# Patient Record
Sex: FEMALE | Race: WHITE | NOT HISPANIC OR LATINO | Employment: OTHER | URBAN - METROPOLITAN AREA
[De-identification: names, ages, dates, MRNs, and addresses within clinical notes are randomized per-mention and may not be internally consistent; named-entity substitution may affect disease eponyms.]

---

## 2017-05-16 DIAGNOSIS — F17.210 CIGARETTE SMOKER: ICD-10-CM

## 2017-05-16 DIAGNOSIS — F17.290 CIGAR SMOKER MOTIVATED TO QUIT: Primary | ICD-10-CM

## 2017-05-16 RX ORDER — IBUPROFEN 200 MG
TABLET ORAL
COMMUNITY
End: 2017-05-16 | Stop reason: SDUPTHER

## 2017-05-16 RX ORDER — IBUPROFEN 200 MG
1 TABLET ORAL DAILY
Qty: 28 PATCH | Refills: 0 | Status: SHIPPED | OUTPATIENT
Start: 2017-05-16 | End: 2017-06-13

## 2017-06-23 ENCOUNTER — TELEPHONE (OUTPATIENT)
Dept: ALLERGY | Facility: CLINIC | Age: 42
End: 2017-06-23

## 2020-02-21 ENCOUNTER — OFFICE VISIT (OUTPATIENT)
Dept: FAMILY MEDICINE | Facility: CLINIC | Age: 45
End: 2020-02-21
Payer: MEDICAID

## 2020-02-21 VITALS
DIASTOLIC BLOOD PRESSURE: 88 MMHG | HEART RATE: 100 BPM | HEIGHT: 64 IN | BODY MASS INDEX: 39.98 KG/M2 | SYSTOLIC BLOOD PRESSURE: 126 MMHG | WEIGHT: 234.19 LBS

## 2020-02-21 DIAGNOSIS — S39.012D STRAIN OF LUMBAR REGION, SUBSEQUENT ENCOUNTER: ICD-10-CM

## 2020-02-21 DIAGNOSIS — M80.00XA OSTEOPOROSIS WITH CURRENT PATHOLOGICAL FRACTURE, UNSPECIFIED OSTEOPOROSIS TYPE, INITIAL ENCOUNTER: ICD-10-CM

## 2020-02-21 DIAGNOSIS — F41.9 ANXIETY: ICD-10-CM

## 2020-02-21 DIAGNOSIS — K58.0 IRRITABLE BOWEL SYNDROME WITH DIARRHEA: ICD-10-CM

## 2020-02-21 DIAGNOSIS — K21.9 GASTROESOPHAGEAL REFLUX DISEASE WITHOUT ESOPHAGITIS: ICD-10-CM

## 2020-02-21 DIAGNOSIS — M25.561 CHRONIC PAIN OF RIGHT KNEE: ICD-10-CM

## 2020-02-21 DIAGNOSIS — F33.1 MODERATE EPISODE OF RECURRENT MAJOR DEPRESSIVE DISORDER: ICD-10-CM

## 2020-02-21 DIAGNOSIS — F90.9 ATTENTION DEFICIT HYPERACTIVITY DISORDER (ADHD), UNSPECIFIED ADHD TYPE: ICD-10-CM

## 2020-02-21 DIAGNOSIS — I10 ESSENTIAL HYPERTENSION: Primary | ICD-10-CM

## 2020-02-21 DIAGNOSIS — Z78.0 MENOPAUSE: ICD-10-CM

## 2020-02-21 DIAGNOSIS — G25.81 RESTLESS LEGS SYNDROME (RLS): ICD-10-CM

## 2020-02-21 DIAGNOSIS — M17.10 ARTHRITIS OF KNEE: ICD-10-CM

## 2020-02-21 DIAGNOSIS — G89.29 CHRONIC PAIN OF RIGHT KNEE: ICD-10-CM

## 2020-02-21 PROCEDURE — 99205 OFFICE O/P NEW HI 60 MIN: CPT | Mod: S$GLB,,, | Performed by: NURSE PRACTITIONER

## 2020-02-21 PROCEDURE — 99205 PR OFFICE/OUTPT VISIT, NEW, LEVL V, 60-74 MIN: ICD-10-PCS | Mod: S$GLB,,, | Performed by: NURSE PRACTITIONER

## 2020-02-21 RX ORDER — ESCITALOPRAM OXALATE 10 MG/1
10 TABLET ORAL DAILY
Qty: 30 TABLET | Refills: 11 | Status: SHIPPED | OUTPATIENT
Start: 2020-02-21 | End: 2021-02-20

## 2020-02-21 RX ORDER — ZOLPIDEM TARTRATE 10 MG/1
10 TABLET ORAL DAILY PRN
COMMUNITY
Start: 2020-01-29

## 2020-02-21 RX ORDER — ALBUTEROL SULFATE 90 UG/1
2 AEROSOL, METERED RESPIRATORY (INHALATION)
COMMUNITY
Start: 2020-01-14

## 2020-02-21 RX ORDER — METHOCARBAMOL 500 MG/1
500 TABLET, FILM COATED ORAL 3 TIMES DAILY
COMMUNITY

## 2020-02-21 RX ORDER — CYCLOBENZAPRINE HCL 10 MG
10 TABLET ORAL 3 TIMES DAILY PRN
COMMUNITY

## 2020-02-21 RX ORDER — DEXTROAMPHETAMINE SACCHARATE, AMPHETAMINE ASPARTATE, DEXTROAMPHETAMINE SULFATE AND AMPHETAMINE SULFATE 7.5; 7.5; 7.5; 7.5 MG/1; MG/1; MG/1; MG/1
1 TABLET ORAL DAILY PRN
COMMUNITY
Start: 2020-01-16

## 2020-02-21 RX ORDER — PANTOPRAZOLE SODIUM 20 MG/1
20 TABLET, DELAYED RELEASE ORAL DAILY
Qty: 30 TABLET | Refills: 5 | Status: SHIPPED | OUTPATIENT
Start: 2020-02-21 | End: 2021-02-20

## 2020-02-21 RX ORDER — TRAMADOL HYDROCHLORIDE AND ACETAMINOPHEN 37.5; 325 MG/1; MG/1
2 TABLET, FILM COATED ORAL EVERY 6 HOURS PRN
Qty: 40 TABLET | Refills: 0 | Status: SHIPPED | OUTPATIENT
Start: 2020-02-21 | End: 2020-02-26

## 2020-02-21 RX ORDER — LOPERAMIDE HCL 2 MG
2 TABLET ORAL 2 TIMES DAILY
COMMUNITY

## 2020-02-21 RX ORDER — ACETAMINOPHEN 500 MG
5 TABLET ORAL DAILY PRN
COMMUNITY

## 2020-02-21 RX ORDER — PRAMIPEXOLE 1.5 MG/1
1.5 TABLET, EXTENDED RELEASE ORAL NIGHTLY
Qty: 30 TABLET | Refills: 2 | Status: SHIPPED | OUTPATIENT
Start: 2020-02-21 | End: 2021-02-20

## 2020-02-21 RX ORDER — LIDOCAINE 50 MG/G
1 PATCH TOPICAL
COMMUNITY

## 2020-02-21 NOTE — PROGRESS NOTES
SUBJECTIVE:    Patient ID: Chelsea Solano is a 44 y.o. female.    Chief Complaint: Establish Care (Pt is fasting to do labs.....mlr) and Medication Refill (Brought bottles, except Robaxin, Flexeril, Lidocaine patches.....mlr)    Patient presents to establish care.  Previous patient of Daniela Mancia.  Patient has significant history including anxiety, ADHD, hypertension.  Previous history of diabetes resolved once patient lost weight per patient.  Patient also with history of hysterectomy.  Current loss is pending related to fall and knee injury that caused patient to be on disability.  Recently presented to outside hospital after turning cause sharp pain in lower back.  X-ray showed no sign of fracture but patient states that she does in fact have a fracture.  First presented to [facility] on 2/8/2020 and again on 2/10/2020 due to continued pain in the area.  She was given tramadol at the time.  Presents today requesting more pain medication.  States she cannot get in with pain management due to her insurance being Medicaid.  States she is unable to make it to [place] to see a pain management doctor in network.  Medications include clonazepam, Adderall, Seroquel, and Robaxin.  States she had loose bowel movements for years and takes over-the-counter Imodium multiple times per day every day.  Was told the past that she has IBS.  Also states that she has suffered with depression and anxiety for years but has never been on long-term maintenance medication.  She was only prescribed clonazepam to deal with anxiety.  Patient is requesting an order for a DEXA scan given the nature of her injury she is concerned for osteoporosis.  Has a lot of home stressors including a pending divorce, a rash in to marriage, and an ill mother and brother.  She is currently helping her older sister care for them but is very limited due to her disability.        No visits with results within 6 Month(s) from this visit.   Latest  known visit with results is:   No results displayed because visit has over 200 results.          Past Medical History:   Diagnosis Date    ADHD (attention deficit hyperactivity disorder)     Anxiety     Bipolar 2 disorder     Depression     Diabetes mellitus     Hypertension 3/16/2013    Kidney stones March 2014    Migraine headache     TIA (transient ischemic attack)     UTI (urinary tract infection) March 2014     Past Surgical History:   Procedure Laterality Date    CHOLECYSTECTOMY      HYSTERECTOMY      INSERTION OF IMPLANTABLE LOOP RECORDER      KNEE SURGERY  2003    WRIST SURGERY  2001     Family History   Problem Relation Age of Onset    Hypertension Mother     Schizophrenia Mother     Heart disease Mother     Heart disease Father     Diabetes Father     Cancer Father     Cancer Maternal Grandmother        Marital Status:   Alcohol History:  reports that she drinks alcohol.  Tobacco History:  reports that she quit smoking about 7 years ago. She has a 20.00 pack-year smoking history. She has never used smokeless tobacco.  Drug History:  reports that she has current or past drug history. Drug: Hydrocodone.    Review of patient's allergies indicates:   Allergen Reactions    Toradol [ketorolac] Hives    Nsaids (non-steroidal anti-inflammatory drug)     Ondansetron Hives    Stadol [butorphanol tartrate] Other (See Comments)     hallucinations       Current Outpatient Medications:     albuterol (PROVENTIL/VENTOLIN HFA) 90 mcg/actuation inhaler, Inhale 2 puffs into the lungs as needed., Disp: , Rfl:     clonazePAM (KLONOPIN) 2 MG Tab, Take 2 mg by mouth 3 (three) times daily as needed., Disp: , Rfl: 5    cyclobenzaprine (FLEXERIL) 10 MG tablet, Take 10 mg by mouth 3 (three) times daily as needed for Muscle spasms., Disp: , Rfl:     dextroamphetamine-amphetamine 30 mg Tab, Take 1 tablet by mouth daily as needed., Disp: , Rfl:     lidocaine (LIDODERM) 5 %, Place 1 patch onto the  skin every 24 hours. Remove & Discard patch within 12 hours or as directed by MD, Disp: , Rfl:     loperamide (IMODIUM A-D) 2 mg Tab, Take 2 mg by mouth 2 (two) times daily., Disp: , Rfl:     melatonin (MELATIN), Take 5 mg by mouth daily as needed for Insomnia., Disp: , Rfl:     methocarbamol (ROBAXIN) 500 MG Tab, Take 500 mg by mouth 3 (three) times daily., Disp: , Rfl:     metoprolol tartrate (LOPRESSOR) 50 MG tablet, Take 1 tablet (50 mg total) by mouth 2 (two) times daily., Disp: 60 tablet, Rfl: 5    zolpidem (AMBIEN) 10 mg Tab, Take 10 mg by mouth daily as needed., Disp: , Rfl:     escitalopram oxalate (LEXAPRO) 10 MG tablet, Take 1 tablet (10 mg total) by mouth once daily., Disp: 30 tablet, Rfl: 11    pantoprazole (PROTONIX) 20 MG tablet, Take 1 tablet (20 mg total) by mouth once daily., Disp: 30 tablet, Rfl: 5    pramipexole 1.5 mg Tb24, Take 1.5 mg by mouth every evening., Disp: 30 tablet, Rfl: 2    quetiapine (SEROQUEL) 400 MG tablet, Take 400 mg by mouth once daily at 6am., Disp: , Rfl: 5    rifAXIMin (XIFAXAN) 550 mg Tab, Take 1 tablet (550 mg total) by mouth 3 (three) times daily. for 8 days, Disp: 24 tablet, Rfl: 0    tramadol-acetaminophen 37.5-325 mg (ULTRACET) 37.5-325 mg Tab, Take 2 tablets by mouth every 6 (six) hours as needed for Pain., Disp: 40 tablet, Rfl: 0    Review of Systems   Constitutional: Positive for fatigue.   HENT: Negative for congestion, ear pain, sinus pressure, sinus pain, tinnitus and trouble swallowing.    Eyes: Negative for pain and redness.   Respiratory: Negative for cough, chest tightness, shortness of breath and wheezing.    Cardiovascular: Negative for chest pain and palpitations.   Gastrointestinal: Positive for diarrhea. Negative for abdominal pain, nausea and vomiting.   Endocrine: Negative for polydipsia and polyuria.   Genitourinary: Negative for dysuria, frequency and urgency.   Musculoskeletal: Positive for arthralgias and back pain. Negative for  "myalgias.   Skin: Negative for rash and wound.   Allergic/Immunologic: Negative.    Neurological: Negative for dizziness, weakness, light-headedness and headaches.   Psychiatric/Behavioral: Positive for sleep disturbance (insomnia and RLS).          Objective:      Vitals:    02/21/20 0945   BP: 126/88   Pulse: 100   Weight: 106.2 kg (234 lb 3.2 oz)   Height: 5' 4" (1.626 m)     Body mass index is 40.2 kg/m².  Physical Exam   Constitutional: She is oriented to person, place, and time. She appears well-developed and well-nourished. No distress.   Overweight female   HENT:   Head: Normocephalic and atraumatic.   Right Ear: Tympanic membrane normal.   Left Ear: Tympanic membrane normal.   Nose: Nose normal. No mucosal edema or nasal deformity.   Mouth/Throat: Oropharynx is clear and moist and mucous membranes are normal. No dental abscesses or dental caries.   Eyes: Pupils are equal, round, and reactive to light. Conjunctivae, EOM and lids are normal.   Neck: Normal range of motion. No tracheal tenderness present. No thyromegaly present.   Cardiovascular: Normal rate, regular rhythm and normal heart sounds.   No murmur heard.  Pulmonary/Chest: Effort normal and breath sounds normal. No accessory muscle usage. No respiratory distress. She has no rhonchi.   Abdominal: Soft. Normal appearance and bowel sounds are normal. There is no tenderness.   Musculoskeletal: Normal range of motion. She exhibits tenderness.   Neurological: She is alert and oriented to person, place, and time.   Skin: Skin is warm and dry. Capillary refill takes less than 2 seconds. No bruising and no ecchymosis noted.   Psychiatric: She has a normal mood and affect. Cognition and memory are normal.   Vitals reviewed.        Assessment:       1. Essential hypertension    2. Osteoporosis with current pathological fracture, unspecified osteoporosis type, initial encounter    3. Arthritis of knee    4. Restless legs syndrome (RLS)    5. Gastroesophageal " reflux disease without esophagitis    6. Irritable bowel syndrome with diarrhea    7. Moderate episode of recurrent major depressive disorder    8. Menopause    9. Anxiety    10. Attention deficit hyperactivity disorder (ADHD), unspecified ADHD type    11. Chronic pain of right knee    12. Strain of lumbar region, subsequent encounter         Plan:       Essential hypertension  -     CBC auto differential; Future; Expected date: 02/21/2020  -     Comprehensive metabolic panel; Future; Expected date: 02/21/2020  -     Lipid panel; Future; Expected date: 02/21/2020  -     TSH; Future; Expected date: 02/21/2020  -     Urinalysis Microscopic; Future  - BP stable. Continue Lopressor    Osteoporosis with current pathological fracture, unspecified osteoporosis type, initial encounter  -     DXA Bone Density Spine And Hip; Future; Expected date: 02/21/2020  -     tramadol-acetaminophen 37.5-325 mg (ULTRACET) 37.5-325 mg Tab; Take 2 tablets by mouth every 6 (six) hours as needed for Pain.  Dispense: 40 tablet; Refill: 0      Arthritis of knee  -     tramadol-acetaminophen 37.5-325 mg (ULTRACET) 37.5-325 mg Tab; Take 2 tablets by mouth every 6 (six) hours as needed for Pain.  Dispense: 40 tablet; Refill: 0    Restless legs syndrome (RLS)  -     pramipexole 1.5 mg Tb24; Take 1.5 mg by mouth every evening.  Dispense: 30 tablet; Refill: 2    Gastroesophageal reflux disease without esophagitis  -     pantoprazole (PROTONIX) 20 MG tablet; Take 1 tablet (20 mg total) by mouth once daily.  Dispense: 30 tablet; Refill: 5    Irritable bowel syndrome with diarrhea  -     rifAXIMin (XIFAXAN) 550 mg Tab; Take 1 tablet (550 mg total) by mouth 3 (three) times daily. for 8 days  Dispense: 24 tablet; Refill: 0    Moderate episode of recurrent major depressive disorder  -     escitalopram oxalate (LEXAPRO) 10 MG tablet; Take 1 tablet (10 mg total) by mouth once daily.  Dispense: 30 tablet; Refill: 11    Menopause  -     DXA Bone Density Spine  And Hip; Future; Expected date: 02/21/2020    Anxiety    Attention deficit hyperactivity disorder (ADHD), unspecified ADHD type    Chronic pain of right knee  - Explained to pt that we will not be writing pain medication for chronic pain.    Strain of lumbar region, subsequent encounter  - Explained to pt that we will not continue to write pain medication for back injury. She will need to find a way to get to an accepted pain management doctor.    Follow up in about 3 months (around 5/21/2020).

## 2020-02-24 PROBLEM — K58.0 IRRITABLE BOWEL SYNDROME WITH DIARRHEA: Status: ACTIVE | Noted: 2020-02-24

## 2020-02-24 PROBLEM — M80.00XA OSTEOPOROSIS WITH CURRENT PATHOLOGICAL FRACTURE: Status: ACTIVE | Noted: 2020-02-24

## 2020-02-24 PROBLEM — K21.9 GASTROESOPHAGEAL REFLUX DISEASE WITHOUT ESOPHAGITIS: Status: ACTIVE | Noted: 2020-02-24

## 2020-02-24 PROBLEM — F33.1 MODERATE EPISODE OF RECURRENT MAJOR DEPRESSIVE DISORDER: Status: ACTIVE | Noted: 2020-02-24

## 2020-02-24 PROBLEM — G25.81 RESTLESS LEGS SYNDROME (RLS): Status: ACTIVE | Noted: 2020-02-24

## 2020-03-05 ENCOUNTER — TELEPHONE (OUTPATIENT)
Dept: FAMILY MEDICINE | Facility: CLINIC | Age: 45
End: 2020-03-05

## 2020-03-05 NOTE — TELEPHONE ENCOUNTER
----- Message from Kirk Bobo sent at 3/5/2020  3:17 PM CST -----  PT WAS RX XIFAXAN BY [facility] BUT ITS NOT IN THE CHART AND PT IS NEEDING A P.A. ON THE MED   ITS A P.A. STILL NEEDED ON MED ?

## 2020-03-05 NOTE — TELEPHONE ENCOUNTER
It's not showing up because I ordered it a while ago and it's showing as ended. Yes she still needs it.

## 2020-03-10 ENCOUNTER — TELEPHONE (OUTPATIENT)
Dept: FAMILY MEDICINE | Facility: CLINIC | Age: 45
End: 2020-03-10

## 2020-03-11 ENCOUNTER — TELEPHONE (OUTPATIENT)
Dept: FAMILY MEDICINE | Facility: CLINIC | Age: 45
End: 2020-03-11

## 2020-03-11 ENCOUNTER — OFFICE VISIT (OUTPATIENT)
Dept: FAMILY MEDICINE | Facility: CLINIC | Age: 45
End: 2020-03-11
Payer: MEDICAID

## 2020-03-11 ENCOUNTER — HOSPITAL ENCOUNTER (OUTPATIENT)
Dept: RADIOLOGY | Facility: HOSPITAL | Age: 45
Discharge: HOME | End: 2020-03-11
Attending: NURSE PRACTITIONER
Payer: MEDICAID

## 2020-03-11 VITALS
HEIGHT: 64 IN | TEMPERATURE: 99 F | DIASTOLIC BLOOD PRESSURE: 68 MMHG | SYSTOLIC BLOOD PRESSURE: 108 MMHG | HEART RATE: 75 BPM | BODY MASS INDEX: 39.95 KG/M2 | WEIGHT: 234 LBS | OXYGEN SATURATION: 96 %

## 2020-03-11 DIAGNOSIS — F17.210 CIGARETTE NICOTINE DEPENDENCE WITHOUT COMPLICATION: ICD-10-CM

## 2020-03-11 DIAGNOSIS — F39 MOOD DISORDER: ICD-10-CM

## 2020-03-11 DIAGNOSIS — I10 ESSENTIAL HYPERTENSION: ICD-10-CM

## 2020-03-11 DIAGNOSIS — R05.9 COUGH: ICD-10-CM

## 2020-03-11 DIAGNOSIS — J18.9 PNEUMONIA OF LEFT LOWER LOBE DUE TO INFECTIOUS ORGANISM: ICD-10-CM

## 2020-03-11 DIAGNOSIS — J18.9 PNEUMONIA OF LEFT LOWER LOBE DUE TO INFECTIOUS ORGANISM: Primary | ICD-10-CM

## 2020-03-11 PROCEDURE — 99214 PR OFFICE/OUTPT VISIT, EST, LEVL IV, 30-39 MIN: ICD-10-PCS | Mod: S$GLB,,, | Performed by: NURSE PRACTITIONER

## 2020-03-11 PROCEDURE — 99214 OFFICE O/P EST MOD 30 MIN: CPT | Mod: S$GLB,,, | Performed by: NURSE PRACTITIONER

## 2020-03-11 PROCEDURE — 71046 X-RAY EXAM CHEST 2 VIEWS: CPT | Mod: TC,PO

## 2020-03-11 RX ORDER — PROMETHAZINE HYDROCHLORIDE AND DEXTROMETHORPHAN HYDROBROMIDE 6.25; 15 MG/5ML; MG/5ML
5 SYRUP ORAL EVERY 4 HOURS PRN
Qty: 210 ML | Refills: 0 | Status: SHIPPED | OUTPATIENT
Start: 2020-03-11 | End: 2020-03-18

## 2020-03-11 RX ORDER — IPRATROPIUM BROMIDE AND ALBUTEROL SULFATE 2.5; .5 MG/3ML; MG/3ML
3 SOLUTION RESPIRATORY (INHALATION) EVERY 6 HOURS PRN
Qty: 1 BOX | Refills: 0 | Status: SHIPPED | OUTPATIENT
Start: 2020-03-11 | End: 2020-03-24 | Stop reason: SDUPTHER

## 2020-03-11 RX ORDER — DOXYCYCLINE 100 MG/1
100 CAPSULE ORAL 2 TIMES DAILY
Qty: 14 CAPSULE | Refills: 0 | Status: SHIPPED | OUTPATIENT
Start: 2020-03-11 | End: 2020-03-18

## 2020-03-11 NOTE — TELEPHONE ENCOUNTER
----- Message from Bertha Mcneil NP sent at 3/11/2020  1:55 PM CDT -----  Chest xray does not show pneumonia. Keep taking medication as prescribed.

## 2020-03-11 NOTE — PROGRESS NOTES
SUBJECTIVE:    Patient ID: Chelsea Solano is a 44 y.o. female.    Chief Complaint: Cough (since Saturday// SW); Fever (SW); and Sore Throat (SW)    Pt presents with c/o cough and fevers since Saturday.  Reports sister who she lives with is also sick.  T-max 102°.  Some sinus congestion as well as very sore throat.  Patient has extensive history including anxiety, hypertension, IBS, and chronic pain.  States she has a history of frequent pneumonias and sees Dr. Mata.  Reports she was previously diagnosed with CVID however no reports available regarding this.  Patient currently still smokes.  She has taken over-the-counter Mucinex with minimal relief.  Taking ibuprofen for fever control.  No fever in office today but states she recently took ibuprofen.  Denies any recent travel or or close contacts with recent travel.      No visits with results within 6 Month(s) from this visit.   Latest known visit with results is:   No results displayed because visit has over 200 results.          Past Medical History:   Diagnosis Date    ADHD (attention deficit hyperactivity disorder)     Anxiety     Bipolar 2 disorder     Depression     Diabetes mellitus     Hypertension 3/16/2013    Kidney stones March 2014    Migraine headache     TIA (transient ischemic attack)     UTI (urinary tract infection) March 2014     Past Surgical History:   Procedure Laterality Date    CHOLECYSTECTOMY      HYSTERECTOMY      INSERTION OF IMPLANTABLE LOOP RECORDER      KNEE SURGERY  2003    WRIST SURGERY  2001     Family History   Problem Relation Age of Onset    Hypertension Mother     Schizophrenia Mother     Heart disease Mother     Heart disease Father     Diabetes Father     Cancer Father     Cancer Maternal Grandmother        Marital Status:   Alcohol History:  reports that she drinks alcohol.  Tobacco History:  reports that she quit smoking about 8 years ago. She has a 20.00 pack-year smoking history.  She has never used smokeless tobacco.  Drug History:  reports that she has current or past drug history. Drug: Hydrocodone.    Review of patient's allergies indicates:   Allergen Reactions    Toradol [ketorolac] Hives    Nsaids (non-steroidal anti-inflammatory drug)     Ondansetron Hives    Stadol [butorphanol tartrate] Other (See Comments)     hallucinations       Current Outpatient Medications:     albuterol (PROVENTIL/VENTOLIN HFA) 90 mcg/actuation inhaler, Inhale 2 puffs into the lungs as needed., Disp: , Rfl:     albuterol-ipratropium (DUO-NEB) 2.5 mg-0.5 mg/3 mL nebulizer solution, Take 3 mLs by nebulization every 6 (six) hours as needed for Wheezing. Rescue, Disp: 1 Box, Rfl: 0    clonazePAM (KLONOPIN) 2 MG Tab, Take 2 mg by mouth 3 (three) times daily as needed., Disp: , Rfl: 5    cyclobenzaprine (FLEXERIL) 10 MG tablet, Take 10 mg by mouth 3 (three) times daily as needed for Muscle spasms., Disp: , Rfl:     dextroamphetamine-amphetamine 30 mg Tab, Take 1 tablet by mouth daily as needed., Disp: , Rfl:     doxycycline (MONODOX) 100 MG capsule, Take 1 capsule (100 mg total) by mouth 2 (two) times daily. for 7 days, Disp: 14 capsule, Rfl: 0    escitalopram oxalate (LEXAPRO) 10 MG tablet, Take 1 tablet (10 mg total) by mouth once daily., Disp: 30 tablet, Rfl: 11    lidocaine (LIDODERM) 5 %, Place 1 patch onto the skin every 24 hours. Remove & Discard patch within 12 hours or as directed by MD, Disp: , Rfl:     loperamide (IMODIUM A-D) 2 mg Tab, Take 2 mg by mouth 2 (two) times daily., Disp: , Rfl:     melatonin (MELATIN), Take 5 mg by mouth daily as needed for Insomnia., Disp: , Rfl:     methocarbamol (ROBAXIN) 500 MG Tab, Take 500 mg by mouth 3 (three) times daily., Disp: , Rfl:     metoprolol tartrate (LOPRESSOR) 50 MG tablet, Take 1 tablet (50 mg total) by mouth 2 (two) times daily., Disp: 60 tablet, Rfl: 5    pantoprazole (PROTONIX) 20 MG tablet, Take 1 tablet (20 mg total) by mouth once  "daily., Disp: 30 tablet, Rfl: 5    pramipexole 1.5 mg Tb24, Take 1.5 mg by mouth every evening., Disp: 30 tablet, Rfl: 2    promethazine-dextromethorphan (PROMETHAZINE-DM) 6.25-15 mg/5 mL Syrp, Take 5 mLs by mouth every 4 (four) hours as needed., Disp: 210 mL, Rfl: 0    quetiapine (SEROQUEL) 400 MG tablet, Take 400 mg by mouth once daily at 6am., Disp: , Rfl: 5    zolpidem (AMBIEN) 10 mg Tab, Take 10 mg by mouth daily as needed., Disp: , Rfl:     Review of Systems   Constitutional: Positive for appetite change, chills, fatigue and fever.   HENT: Positive for congestion, sinus pressure and sore throat. Negative for ear pain and sinus pain.    Eyes: Negative for pain and redness.   Respiratory: Positive for cough and shortness of breath. Negative for wheezing.    Cardiovascular: Negative for chest pain and palpitations.   Gastrointestinal: Negative for nausea and vomiting.   Genitourinary: Negative for dysuria.   Musculoskeletal: Positive for myalgias. Negative for neck pain.   Skin: Negative.    Allergic/Immunologic: Negative.    Neurological: Positive for headaches. Negative for dizziness and weakness.   Psychiatric/Behavioral: Negative.           Objective:      Vitals:    03/11/20 1115   BP: 108/68   Pulse: 75   Temp: 98.7 °F (37.1 °C)   SpO2: 96%   Weight: 106.1 kg (234 lb)   Height: 5' 4" (1.626 m)     Body mass index is 40.17 kg/m².  Physical Exam   Constitutional: She is oriented to person, place, and time. She appears well-developed and well-nourished.   HENT:   Head: Normocephalic.   Nose: Nose normal.   Mouth/Throat: Posterior oropharyngeal erythema present.   Eyes: Pupils are equal, round, and reactive to light.   Neck: Normal range of motion.   Cardiovascular: Normal rate and regular rhythm.   Pulmonary/Chest: Effort normal. No respiratory distress. She has no wheezes. She has rhonchi in the left middle field and the left lower field.   Abdominal: Soft.   Musculoskeletal: Normal range of motion. "   Lymphadenopathy:     She has no cervical adenopathy.   Neurological: She is alert and oriented to person, place, and time.   Skin: Skin is warm and dry.   Psychiatric: She has a normal mood and affect.         Assessment:       1. Pneumonia of left lower lobe due to infectious organism    2. Cough    3. Essential hypertension    4. Mood disorder    5. Cigarette nicotine dependence without complication         Plan:       Pneumonia of left lower lobe due to infectious organism  -     doxycycline (MONODOX) 100 MG capsule; Take 1 capsule (100 mg total) by mouth 2 (two) times daily. for 7 days  Dispense: 14 capsule; Refill: 0  -     promethazine-dextromethorphan (PROMETHAZINE-DM) 6.25-15 mg/5 mL Syrp; Take 5 mLs by mouth every 4 (four) hours as needed.  Dispense: 210 mL; Refill: 0  -     NEBULIZER FOR HOME USE  -     X-Ray Chest PA And Lateral; Future; Expected date: 03/11/2020  -     albuterol-ipratropium (DUO-NEB) 2.5 mg-0.5 mg/3 mL nebulizer solution; Take 3 mLs by nebulization every 6 (six) hours as needed for Wheezing. Rescue  Dispense: 1 Box; Refill: 0    Cough  -     promethazine-dextromethorphan (PROMETHAZINE-DM) 6.25-15 mg/5 mL Syrp; Take 5 mLs by mouth every 4 (four) hours as needed.  Dispense: 210 mL; Refill: 0  -     NEBULIZER FOR HOME USE  -     albuterol-ipratropium (DUO-NEB) 2.5 mg-0.5 mg/3 mL nebulizer solution; Take 3 mLs by nebulization every 6 (six) hours as needed for Wheezing. Rescue  Dispense: 1 Box; Refill: 0  - Continue Mucinex    Essential hypertension    Mood disorder    Cigarette nicotine dependence without complication  - Discussion with patient on affects of cigarette smoking on respiratory illnesses. Pt states she has nicotine patches and plans to try to quit soon.    Continue Ibuprofen as needed for fever control. Rest, fluids. Advised that if she feels SOB, difficulty getting around, or feels worse to please go to ER.  Follow up if symptoms worsen or fail to improve.

## 2020-03-16 ENCOUNTER — TELEPHONE (OUTPATIENT)
Dept: FAMILY MEDICINE | Facility: CLINIC | Age: 45
End: 2020-03-16

## 2020-03-16 DIAGNOSIS — R05.9 COUGH: Primary | ICD-10-CM

## 2020-03-16 DIAGNOSIS — R05.9 COUGH: ICD-10-CM

## 2020-03-16 DIAGNOSIS — J18.9 PNEUMONIA OF LEFT LOWER LOBE DUE TO INFECTIOUS ORGANISM: Primary | ICD-10-CM

## 2020-03-16 RX ORDER — CODEINE PHOSPHATE AND GUAIFENESIN 10; 100 MG/5ML; MG/5ML
5 SOLUTION ORAL EVERY 6 HOURS PRN
Qty: 100 ML | Refills: 0 | Status: SHIPPED | OUTPATIENT
Start: 2020-03-16 | End: 2020-03-21

## 2020-03-16 RX ORDER — PROMETHAZINE HYDROCHLORIDE AND CODEINE PHOSPHATE 6.25; 1 MG/5ML; MG/5ML
5 SOLUTION ORAL EVERY 8 HOURS PRN
Qty: 45 ML | Refills: 0 | Status: SHIPPED | OUTPATIENT
Start: 2020-03-16 | End: 2020-03-19

## 2020-03-16 RX ORDER — LEVOFLOXACIN 750 MG/1
750 TABLET ORAL DAILY
Qty: 7 TABLET | Refills: 0 | Status: SHIPPED | OUTPATIENT
Start: 2020-03-16 | End: 2020-03-23

## 2020-03-16 NOTE — TELEPHONE ENCOUNTER
----- Message from Dianne Lewis sent at 3/16/2020  3:05 PM CDT -----  Received a call from [facility] the Promethazine w/Codeine is not available wanting to switch to DM. Earnix was going to check with the pt and call us back.

## 2020-03-16 NOTE — TELEPHONE ENCOUNTER
Spoke to pt. Said her fever is around 101 like her sister but has gone down since taking OTC ibuprofen. She has not been out of the house since she saw you last Monday. Only once to go  Rx's with sister. Symptoms still the same. Antibiotic & cough syrup didn't seem to help at all.. Wanting a different / stronger antibiotic & cough syrup.

## 2020-03-16 NOTE — TELEPHONE ENCOUNTER
----- Message from Chelsea Beyer sent at 3/16/2020  3:44 PM CDT -----  Pt is not able to find the cough medication. Would like a hard script. Waiting for rx

## 2020-03-16 NOTE — TELEPHONE ENCOUNTER
----- Message from Farnaz Brennan sent at 3/16/2020  3:12 PM CDT -----  Contact: pt  Pt called and [facility] on [address]  Has the cough syrup and its cherry tussin    [phone]    Its also for her sister: Sylvester Eligio

## 2020-03-16 NOTE — TELEPHONE ENCOUNTER
Will send but keep up with breathing treatments. Cough may linger for up to one month after infection.

## 2020-03-16 NOTE — TELEPHONE ENCOUNTER
----- Message from Dianne Lewis sent at 3/16/2020  8:46 AM CDT -----  Pt states she was seen in clinic last week and still running fever, severe cough,sore throat and congestion. Pt is out of the abx, cough syrup. No known Corona exposure, negative travel.

## 2020-03-16 NOTE — TELEPHONE ENCOUNTER
How high is fever? Has she been out in the community at all in the last 2 weeks? If there is any chance of exposure, please refer to testing sites. Otherwise we can strengthen antibiotic. Make sure to keep doing breathing treatments and quit smoking until recovery.

## 2020-03-23 ENCOUNTER — TELEPHONE (OUTPATIENT)
Dept: FAMILY MEDICINE | Facility: CLINIC | Age: 45
End: 2020-03-23

## 2020-03-23 RX ORDER — CODEINE PHOSPHATE AND GUAIFENESIN 10; 100 MG/5ML; MG/5ML
5 SOLUTION ORAL EVERY 6 HOURS PRN
Qty: 100 ML | Refills: 0 | Status: SHIPPED | OUTPATIENT
Start: 2020-03-23 | End: 2020-03-24 | Stop reason: SDUPTHER

## 2020-03-23 NOTE — TELEPHONE ENCOUNTER
Fever, 100.7 (102.9) congestion, bad cough, throat severely sore, Tylenol and motrin not effective for fever. Says cough syrup with codeine breaks fever but she ran out    Asked travel questions negative for travel, says dad is waiting on COVID-19 results.

## 2020-03-23 NOTE — TELEPHONE ENCOUNTER
Please call and clarify what symptoms they are still having- any fever, SOB? Based on their previous symptoms sounds like could have been the flu or COVID19 though outside of the window for flu treatment. If she's still having fever then recommned [facility]

## 2020-03-23 NOTE — TELEPHONE ENCOUNTER
----- Message from Kirk Bobo sent at 3/23/2020 10:22 AM CDT -----  Pt is still sick, the antibiotic  and cough meds are not helping. Was here to see last week.  Pt is needing something else called in   Pharm [facility] [address]  Pt [phone]

## 2020-03-23 NOTE — TELEPHONE ENCOUNTER
I would recommend they call the [facility] clinic regarding testing- I will send in one refill of cough medicine

## 2020-03-24 DIAGNOSIS — J18.9 PNEUMONIA OF LEFT LOWER LOBE DUE TO INFECTIOUS ORGANISM: ICD-10-CM

## 2020-03-24 DIAGNOSIS — R05.9 COUGH: ICD-10-CM

## 2020-03-24 DIAGNOSIS — R05.9 COUGH: Primary | ICD-10-CM

## 2020-03-24 RX ORDER — CODEINE PHOSPHATE AND GUAIFENESIN 10; 100 MG/5ML; MG/5ML
5 SOLUTION ORAL EVERY 6 HOURS PRN
Qty: 100 ML | Refills: 0 | Status: SHIPPED | OUTPATIENT
Start: 2020-03-24 | End: 2020-04-03

## 2020-03-24 RX ORDER — IPRATROPIUM BROMIDE AND ALBUTEROL SULFATE 2.5; .5 MG/3ML; MG/3ML
3 SOLUTION RESPIRATORY (INHALATION) EVERY 6 HOURS PRN
Qty: 1 BOX | Refills: 0 | Status: SHIPPED | OUTPATIENT
Start: 2020-03-24 | End: 2020-04-22 | Stop reason: SDUPTHER

## 2020-03-24 NOTE — TELEPHONE ENCOUNTER
Please approve, Zenobia sent cough medication and her pharmacy does not have in stock, sending to another pharmacy. Thanks        Also, gave number for COVID-19 testing hotline. Would like to know what they are to do IF they are able to get tested and when waiting on results. Please advise. Thanks

## 2020-03-24 NOTE — TELEPHONE ENCOUNTER
----- Message from Daniela Johansen MA sent at 3/24/2020 10:01 AM CDT -----  Pt is returning your call from yesterday afternoon, regarding her illness    Pt - [phone]

## 2020-03-25 ENCOUNTER — TELEPHONE (OUTPATIENT)
Dept: FAMILY MEDICINE | Facility: CLINIC | Age: 45
End: 2020-03-25

## 2020-03-25 NOTE — TELEPHONE ENCOUNTER
----- Message from Daniela Johansen MA sent at 3/25/2020 11:41 AM CDT -----  Pt is requesting a return call, would not give me a reason.

## 2020-03-27 ENCOUNTER — TELEPHONE (OUTPATIENT)
Dept: FAMILY MEDICINE | Facility: CLINIC | Age: 45
End: 2020-03-27

## 2020-03-27 ENCOUNTER — TELEPHONE (OUTPATIENT)
Dept: EMERGENCY MEDICINE | Facility: HOSPITAL | Age: 45
End: 2020-03-27

## 2020-03-27 DIAGNOSIS — J18.9 PNEUMONIA OF LEFT LOWER LOBE DUE TO INFECTIOUS ORGANISM: ICD-10-CM

## 2020-03-27 DIAGNOSIS — R05.9 COUGH: ICD-10-CM

## 2020-03-27 RX ORDER — IPRATROPIUM BROMIDE AND ALBUTEROL SULFATE 2.5; .5 MG/3ML; MG/3ML
3 SOLUTION RESPIRATORY (INHALATION) EVERY 6 HOURS PRN
Qty: 1 BOX | Refills: 0 | Status: CANCELLED | OUTPATIENT
Start: 2020-03-27 | End: 2021-03-27

## 2020-03-27 NOTE — TELEPHONE ENCOUNTER
No. She was written for 5 days worth of cough medication on 3/24. She is not getting codeine cough medication indefinitely. This has been 2 weeks now.

## 2020-03-27 NOTE — TELEPHONE ENCOUNTER
----- Message from Christiephil Jeramie sent at 3/27/2020 10:33 AM CDT -----  Pt is saying that she is needing more cough meds   Pharm [facility] [address]   Pt [phone]

## 2020-03-27 NOTE — TELEPHONE ENCOUNTER
Is she wanting an inhaler or neb treatments? I can send in either but please advise her that albuterol is in short supply right now and she may potentially have difficulty getting it.

## 2020-03-27 NOTE — TELEPHONE ENCOUNTER
----- Message from Kirk Bobo sent at 3/27/2020 10:36 AM CDT -----  Albuterol   [facility]   [phone]

## 2020-03-27 NOTE — TELEPHONE ENCOUNTER
Spoke with pt let her know the below, pt stated she will run out before the weekend is up, let pt know per the provider she is not sending in anymore, pt verbalized under standing.

## 2020-04-22 DIAGNOSIS — J18.9 PNEUMONIA OF LEFT LOWER LOBE DUE TO INFECTIOUS ORGANISM: ICD-10-CM

## 2020-04-22 DIAGNOSIS — R05.9 COUGH: ICD-10-CM

## 2020-04-22 RX ORDER — IPRATROPIUM BROMIDE AND ALBUTEROL SULFATE 2.5; .5 MG/3ML; MG/3ML
3 SOLUTION RESPIRATORY (INHALATION) EVERY 6 HOURS PRN
Qty: 1 BOX | Refills: 0 | Status: SHIPPED | OUTPATIENT
Start: 2020-04-22 | End: 2021-04-22

## 2020-05-07 ENCOUNTER — ANESTHESIA EVENT (OUTPATIENT)
Dept: INTENSIVE CARE | Facility: HOSPITAL | Age: 45
DRG: 917 | End: 2020-05-07
Payer: COMMERCIAL

## 2020-05-07 ENCOUNTER — ANESTHESIA (OUTPATIENT)
Dept: INTENSIVE CARE | Facility: HOSPITAL | Age: 45
DRG: 917 | End: 2020-05-07
Payer: COMMERCIAL

## 2020-05-07 ENCOUNTER — CLINICAL SUPPORT (OUTPATIENT)
Dept: CARDIOLOGY | Facility: HOSPITAL | Age: 45
DRG: 917 | End: 2020-05-07
Attending: INTERNAL MEDICINE
Payer: COMMERCIAL

## 2020-05-07 ENCOUNTER — HOSPITAL ENCOUNTER (INPATIENT)
Facility: HOSPITAL | Age: 45
LOS: 2 days | DRG: 917 | End: 2020-05-09
Attending: EMERGENCY MEDICINE | Admitting: STUDENT IN AN ORGANIZED HEALTH CARE EDUCATION/TRAINING PROGRAM
Payer: MEDICAID

## 2020-05-07 VITALS — HEIGHT: 64 IN | WEIGHT: 234.38 LBS | BODY MASS INDEX: 40.01 KG/M2

## 2020-05-07 DIAGNOSIS — E23.2 DIABETES INSIPIDUS: ICD-10-CM

## 2020-05-07 DIAGNOSIS — J96.01 ACUTE HYPOXEMIC RESPIRATORY FAILURE: ICD-10-CM

## 2020-05-07 DIAGNOSIS — T40.604D OPIATE OVERDOSE, UNDETERMINED INTENT, SUBSEQUENT ENCOUNTER: ICD-10-CM

## 2020-05-07 DIAGNOSIS — R07.9 CHEST PAIN: ICD-10-CM

## 2020-05-07 DIAGNOSIS — G93.6 CEREBRAL EDEMA: ICD-10-CM

## 2020-05-07 DIAGNOSIS — T17.800D ASPIRATION INTO LOWER RESPIRATORY TRACT, SUBSEQUENT ENCOUNTER: ICD-10-CM

## 2020-05-07 DIAGNOSIS — R00.0 SINUS TACHYCARDIA: ICD-10-CM

## 2020-05-07 DIAGNOSIS — I46.9 CARDIAC ARREST: ICD-10-CM

## 2020-05-07 PROBLEM — T17.800A ASPIRATION INTO LOWER RESPIRATORY TRACT: Status: ACTIVE | Noted: 2020-05-07

## 2020-05-07 PROBLEM — Z79.899 POLYPHARMACY: Status: ACTIVE | Noted: 2020-05-07

## 2020-05-07 PROBLEM — T42.4X1A BENZODIAZEPINE OVERDOSE: Status: ACTIVE | Noted: 2020-05-07

## 2020-05-07 PROBLEM — T40.601A OPIATE OVERDOSE: Status: ACTIVE | Noted: 2020-05-07

## 2020-05-07 LAB
ALBUMIN SERPL BCP-MCNC: 3.5 G/DL (ref 3.5–5.2)
ALBUMIN SERPL BCP-MCNC: 3.7 G/DL (ref 3.5–5.2)
ALLENS TEST: ABNORMAL
ALP SERPL-CCNC: 269 U/L (ref 55–135)
ALP SERPL-CCNC: 274 U/L (ref 55–135)
ALT SERPL W/O P-5'-P-CCNC: 60 U/L (ref 10–44)
ALT SERPL W/O P-5'-P-CCNC: 67 U/L (ref 10–44)
AMPHET+METHAMPHET UR QL: NEGATIVE
ANION GAP SERPL CALC-SCNC: 10 MMOL/L (ref 8–16)
ANION GAP SERPL CALC-SCNC: 12 MMOL/L (ref 8–16)
ANION GAP SERPL CALC-SCNC: 14 MMOL/L (ref 8–16)
ANION GAP SERPL CALC-SCNC: 9 MMOL/L (ref 8–16)
ANION GAP SERPL CALC-SCNC: 9 MMOL/L (ref 8–16)
AORTIC ROOT ANNULUS: 2.68 CM
AORTIC VALVE CUSP SEPERATION: 1.93 CM
APAP SERPL-MCNC: <10 UG/ML (ref 10–20)
AST SERPL-CCNC: 122 U/L (ref 10–40)
AST SERPL-CCNC: 129 U/L (ref 10–40)
AV INDEX (PROSTH): 0.76
AV MEAN GRADIENT: 1 MMHG
AV PEAK GRADIENT: 3 MMHG
AV VALVE AREA: 2.39 CM2
AV VELOCITY RATIO: 68.75
B-HCG UR QL: NEGATIVE
BACTERIA #/AREA URNS HPF: NEGATIVE /HPF
BARBITURATES UR QL SCN>200 NG/ML: NEGATIVE
BASOPHILS # BLD AUTO: 0.05 K/UL (ref 0–0.2)
BASOPHILS NFR BLD: 0.5 % (ref 0–1.9)
BENZODIAZ UR QL SCN>200 NG/ML: NORMAL
BILIRUB SERPL-MCNC: 0.4 MG/DL (ref 0.1–1)
BILIRUB SERPL-MCNC: 0.6 MG/DL (ref 0.1–1)
BILIRUB UR QL STRIP: NEGATIVE
BSA FOR ECHO PROCEDURE: 2.19 M2
BUN SERPL-MCNC: 15 MG/DL (ref 6–20)
BUN SERPL-MCNC: 16 MG/DL (ref 6–20)
BUN SERPL-MCNC: 17 MG/DL (ref 6–20)
BUN SERPL-MCNC: 18 MG/DL (ref 6–20)
BUN SERPL-MCNC: 19 MG/DL (ref 6–20)
BZE UR QL SCN: NEGATIVE
CALCIUM SERPL-MCNC: 8.5 MG/DL (ref 8.7–10.5)
CALCIUM SERPL-MCNC: 8.6 MG/DL (ref 8.7–10.5)
CALCIUM SERPL-MCNC: 8.7 MG/DL (ref 8.7–10.5)
CANNABINOIDS UR QL SCN: NEGATIVE
CHLORIDE SERPL-SCNC: 106 MMOL/L (ref 95–110)
CHLORIDE SERPL-SCNC: 106 MMOL/L (ref 95–110)
CHLORIDE SERPL-SCNC: 107 MMOL/L (ref 95–110)
CHLORIDE SERPL-SCNC: 110 MMOL/L (ref 95–110)
CHLORIDE SERPL-SCNC: 98 MMOL/L (ref 95–110)
CK MB SERPL-MCNC: 1.2 NG/ML (ref 0.1–6.5)
CK SERPL-CCNC: 214 U/L (ref 20–180)
CLARITY UR: CLEAR
CO2 SERPL-SCNC: 24 MMOL/L (ref 23–29)
CO2 SERPL-SCNC: 25 MMOL/L (ref 23–29)
CO2 SERPL-SCNC: 25 MMOL/L (ref 23–29)
COLOR UR: YELLOW
CREAT SERPL-MCNC: 0.8 MG/DL (ref 0.5–1.4)
CREAT SERPL-MCNC: 0.8 MG/DL (ref 0.5–1.4)
CREAT SERPL-MCNC: 0.9 MG/DL (ref 0.5–1.4)
CREAT SERPL-MCNC: 0.9 MG/DL (ref 0.5–1.4)
CREAT SERPL-MCNC: 1.2 MG/DL (ref 0.5–1.4)
CREAT UR-MCNC: 150 MG/DL (ref 15–325)
CTP QC/QA: YES
CV ECHO LV RWT: 0.52 CM
DELSYS: ABNORMAL
DIFFERENTIAL METHOD: ABNORMAL
DOP CALC AO PEAK VEL: 0.8 M/S
DOP CALC AO VTI: 15.37 CM
DOP CALC LVOT AREA: 3.1 CM2
DOP CALC LVOT DIAMETER: 2 CM
DOP CALC LVOT PEAK VEL: 55 M/S
DOP CALC LVOT STROKE VOLUME: 36.77 CM3
DOP CALCLVOT PEAK VEL VTI: 11.71 CM
E WAVE DECELERATION TIME: 236.2 MSEC
E/A RATIO: 1.1
E/E' RATIO: 7.08 M/S
ECHO LV POSTERIOR WALL: 1.07 CM (ref 0.6–1.1)
EOSINOPHIL # BLD AUTO: 0.1 K/UL (ref 0–0.5)
EOSINOPHIL NFR BLD: 0.8 % (ref 0–8)
ERYTHROCYTE [DISTWIDTH] IN BLOOD BY AUTOMATED COUNT: 14.4 % (ref 11.5–14.5)
ERYTHROCYTE [SEDIMENTATION RATE] IN BLOOD BY WESTERGREN METHOD: 20 MM/H
ERYTHROCYTE [SEDIMENTATION RATE] IN BLOOD BY WESTERGREN METHOD: 28 MM/H
ERYTHROCYTE [SEDIMENTATION RATE] IN BLOOD BY WESTERGREN METHOD: 28 MM/H
EST. GFR  (AFRICAN AMERICAN): >60 ML/MIN/1.73 M^2
EST. GFR  (NON AFRICAN AMERICAN): 54.7 ML/MIN/1.73 M^2
EST. GFR  (NON AFRICAN AMERICAN): >60 ML/MIN/1.73 M^2
ETHANOL SERPL-MCNC: <5 MG/DL
FIO2: 0.5
FIO2: 100
FIO2: 40
FRACTIONAL SHORTENING: 30 % (ref 28–44)
GLUCOSE SERPL-MCNC: 121 MG/DL (ref 70–110)
GLUCOSE SERPL-MCNC: 125 MG/DL (ref 70–110)
GLUCOSE SERPL-MCNC: 125 MG/DL (ref 70–110)
GLUCOSE SERPL-MCNC: 130 MG/DL (ref 70–110)
GLUCOSE SERPL-MCNC: 130 MG/DL (ref 70–110)
GLUCOSE SERPL-MCNC: 153 MG/DL (ref 70–110)
GLUCOSE SERPL-MCNC: 153 MG/DL (ref 70–110)
GLUCOSE SERPL-MCNC: 183 MG/DL (ref 70–110)
GLUCOSE SERPL-MCNC: 291 MG/DL (ref 70–110)
GLUCOSE SERPL-MCNC: 318 MG/DL (ref 70–110)
GLUCOSE SERPL-MCNC: 89 MG/DL (ref 70–110)
GLUCOSE UR QL STRIP: ABNORMAL
HCO3 UR-SCNC: 24.8 MMOL/L (ref 24–28)
HCO3 UR-SCNC: 25.4 MMOL/L (ref 24–28)
HCO3 UR-SCNC: 26.6 MMOL/L (ref 24–28)
HCT VFR BLD AUTO: 47.8 % (ref 37–48.5)
HCT VFR BLD CALC: 49 %PCV (ref 36–54)
HGB BLD-MCNC: 14.2 G/DL (ref 12–16)
HGB UR QL STRIP: NEGATIVE
HYALINE CASTS #/AREA URNS LPF: 19 /LPF
IMM GRANULOCYTES # BLD AUTO: 0.27 K/UL (ref 0–0.04)
IMM GRANULOCYTES NFR BLD AUTO: 2.6 % (ref 0–0.5)
INTERVENTRICULAR SEPTUM: 1.07 CM (ref 0.6–1.1)
IVRT: 140.89 MSEC
KETONES UR QL STRIP: NEGATIVE
LACTATE SERPL-SCNC: 2.3 MMOL/L (ref 0.5–1.9)
LACTATE SERPL-SCNC: 2.3 MMOL/L (ref 0.5–1.9)
LEFT ATRIUM SIZE: 2.92 CM
LEFT INTERNAL DIMENSION IN SYSTOLE: 2.91 CM (ref 2.1–4)
LEFT VENTRICLE MASS INDEX: 71 G/M2
LEFT VENTRICULAR INTERNAL DIMENSION IN DIASTOLE: 4.15 CM (ref 3.5–6)
LEFT VENTRICULAR MASS: 148.18 G
LEUKOCYTE ESTERASE UR QL STRIP: NEGATIVE
LV LATERAL E/E' RATIO: 5.75 M/S
LV SEPTAL E/E' RATIO: 9.2 M/S
LYMPHOCYTES # BLD AUTO: 3.5 K/UL (ref 1–4.8)
LYMPHOCYTES NFR BLD: 33.8 % (ref 18–48)
MAGNESIUM SERPL-MCNC: 1.8 MG/DL (ref 1.6–2.6)
MAGNESIUM SERPL-MCNC: 2.2 MG/DL (ref 1.6–2.6)
MAGNESIUM SERPL-MCNC: 2.3 MG/DL (ref 1.6–2.6)
MAGNESIUM SERPL-MCNC: 2.4 MG/DL (ref 1.6–2.6)
MCH RBC QN AUTO: 30.5 PG (ref 27–31)
MCHC RBC AUTO-ENTMCNC: 29.7 G/DL (ref 32–36)
MCV RBC AUTO: 103 FL (ref 82–98)
MICROSCOPIC COMMENT: ABNORMAL
MODE: ABNORMAL
MONOCYTES # BLD AUTO: 0.2 K/UL (ref 0.3–1)
MONOCYTES NFR BLD: 1.7 % (ref 4–15)
MV PEAK A VEL: 0.42 M/S
MV PEAK E VEL: 0.46 M/S
NEUTROPHILS # BLD AUTO: 6.2 K/UL (ref 1.8–7.7)
NEUTROPHILS NFR BLD: 60.6 % (ref 38–73)
NITRITE UR QL STRIP: NEGATIVE
NRBC BLD-RTO: 0 /100 WBC
OPIATES UR QL SCN: NORMAL
PCO2 BLDA: 42.5 MMHG (ref 35–45)
PCO2 BLDA: 50.5 MMHG (ref 35–45)
PCO2 BLDA: 68.9 MMHG (ref 35–45)
PCP UR QL SCN>25 NG/ML: NEGATIVE
PEEP: 5
PEEP: 5
PEEP: 8
PH SMN: 7.19 [PH] (ref 7.35–7.45)
PH SMN: 7.31 [PH] (ref 7.35–7.45)
PH SMN: 7.38 [PH] (ref 7.35–7.45)
PH UR STRIP: 6 [PH] (ref 5–8)
PHOSPHATE SERPL-MCNC: 3.3 MG/DL (ref 2.7–4.5)
PHOSPHATE SERPL-MCNC: 3.6 MG/DL (ref 2.7–4.5)
PHOSPHATE SERPL-MCNC: 4.8 MG/DL (ref 2.7–4.5)
PLATELET # BLD AUTO: 282 K/UL (ref 150–350)
PMV BLD AUTO: 9.7 FL (ref 9.2–12.9)
PO2 BLDA: 424 MMHG (ref 80–100)
PO2 BLDA: 79 MMHG (ref 80–100)
PO2 BLDA: 92 MMHG (ref 80–100)
POC BE: -1 MMOL/L
POC BE: -2 MMOL/L
POC BE: 0 MMOL/L
POC IONIZED CALCIUM: 1.19 MMOL/L (ref 1.06–1.42)
POC SATURATED O2: 100 % (ref 95–100)
POC SATURATED O2: 95 % (ref 95–100)
POC SATURATED O2: 96 % (ref 95–100)
POC TCO2: 26 MMOL/L (ref 23–27)
POC TCO2: 27 MMOL/L (ref 23–27)
POC TCO2: 29 MMOL/L (ref 23–27)
POTASSIUM BLD-SCNC: 3.2 MMOL/L (ref 3.5–5.1)
POTASSIUM SERPL-SCNC: 3.3 MMOL/L (ref 3.5–5.1)
POTASSIUM SERPL-SCNC: 3.3 MMOL/L (ref 3.5–5.1)
POTASSIUM SERPL-SCNC: 3.5 MMOL/L (ref 3.5–5.1)
POTASSIUM SERPL-SCNC: 3.6 MMOL/L (ref 3.5–5.1)
POTASSIUM SERPL-SCNC: 4.6 MMOL/L (ref 3.5–5.1)
PROCALCITONIN SERPL IA-MCNC: 0.6 NG/ML (ref 0–0.5)
PROT SERPL-MCNC: 6.8 G/DL (ref 6–8.4)
PROT SERPL-MCNC: 7 G/DL (ref 6–8.4)
PROT UR QL STRIP: ABNORMAL
PV PEAK VELOCITY: 81.16 CM/S
RBC # BLD AUTO: 4.65 M/UL (ref 4–5.4)
RBC #/AREA URNS HPF: 1 /HPF (ref 0–4)
RIGHT VENTRICULAR END-DIASTOLIC DIMENSION: 185 CM
SALICYLATES SERPL-MCNC: <4 MG/DL (ref 15–30)
SAMPLE: ABNORMAL
SARS-COV-2 RDRP RESP QL NAA+PROBE: NEGATIVE
SITE: ABNORMAL
SODIUM BLD-SCNC: 139 MMOL/L (ref 136–145)
SODIUM SERPL-SCNC: 137 MMOL/L (ref 136–145)
SODIUM SERPL-SCNC: 140 MMOL/L (ref 136–145)
SODIUM SERPL-SCNC: 140 MMOL/L (ref 136–145)
SODIUM SERPL-SCNC: 142 MMOL/L (ref 136–145)
SODIUM SERPL-SCNC: 144 MMOL/L (ref 136–145)
SP GR UR STRIP: 1.02 (ref 1–1.03)
SP02: 96
SQUAMOUS #/AREA URNS HPF: 4 /HPF
TDI LATERAL: 0.08 M/S
TDI SEPTAL: 0.05 M/S
TDI: 0.07 M/S
TOXICOLOGY INFORMATION: NORMAL
TROPONIN I SERPL DL<=0.01 NG/ML-MCNC: 0.03 NG/ML
TROPONIN I SERPL DL<=0.01 NG/ML-MCNC: <0.03 NG/ML
TROPONIN I SERPL DL<=0.01 NG/ML-MCNC: <0.03 NG/ML
URN SPEC COLLECT METH UR: ABNORMAL
UROBILINOGEN UR STRIP-ACNC: ABNORMAL EU/DL
VT: 380
VT: 500
VT: 500
WBC # BLD AUTO: 10.25 K/UL (ref 3.9–12.7)
WBC #/AREA URNS HPF: 3 /HPF (ref 0–5)

## 2020-05-07 PROCEDURE — 25000242 PHARM REV CODE 250 ALT 637 W/ HCPCS: Performed by: EMERGENCY MEDICINE

## 2020-05-07 PROCEDURE — 36620 INSERTION CATHETER ARTERY: CPT

## 2020-05-07 PROCEDURE — 84100 ASSAY OF PHOSPHORUS: CPT

## 2020-05-07 PROCEDURE — 84295 ASSAY OF SERUM SODIUM: CPT

## 2020-05-07 PROCEDURE — 84484 ASSAY OF TROPONIN QUANT: CPT | Mod: 91

## 2020-05-07 PROCEDURE — 51702 INSERT TEMP BLADDER CATH: CPT

## 2020-05-07 PROCEDURE — 80053 COMPREHEN METABOLIC PANEL: CPT | Mod: 91

## 2020-05-07 PROCEDURE — 87086 URINE CULTURE/COLONY COUNT: CPT

## 2020-05-07 PROCEDURE — 95819 EEG AWAKE AND ASLEEP: CPT

## 2020-05-07 PROCEDURE — 99285 EMERGENCY DEPT VISIT HI MDM: CPT | Mod: 25

## 2020-05-07 PROCEDURE — 96361 HYDRATE IV INFUSION ADD-ON: CPT

## 2020-05-07 PROCEDURE — 80307 DRUG TEST PRSMV CHEM ANLYZR: CPT

## 2020-05-07 PROCEDURE — 63600175 PHARM REV CODE 636 W HCPCS: Performed by: INTERNAL MEDICINE

## 2020-05-07 PROCEDURE — 86316 IMMUNOASSAY TUMOR OTHER: CPT

## 2020-05-07 PROCEDURE — 85025 COMPLETE CBC W/AUTO DIFF WBC: CPT

## 2020-05-07 PROCEDURE — 83735 ASSAY OF MAGNESIUM: CPT | Mod: 91

## 2020-05-07 PROCEDURE — 25000003 PHARM REV CODE 250: Performed by: STUDENT IN AN ORGANIZED HEALTH CARE EDUCATION/TRAINING PROGRAM

## 2020-05-07 PROCEDURE — 82553 CREATINE MB FRACTION: CPT

## 2020-05-07 PROCEDURE — 99291 CRITICAL CARE FIRST HOUR: CPT | Mod: ,,, | Performed by: INTERNAL MEDICINE

## 2020-05-07 PROCEDURE — 96360 HYDRATION IV INFUSION INIT: CPT

## 2020-05-07 PROCEDURE — 85014 HEMATOCRIT: CPT

## 2020-05-07 PROCEDURE — 81001 URINALYSIS AUTO W/SCOPE: CPT

## 2020-05-07 PROCEDURE — 84132 ASSAY OF SERUM POTASSIUM: CPT

## 2020-05-07 PROCEDURE — 99900026 HC AIRWAY MAINTENANCE (STAT)

## 2020-05-07 PROCEDURE — 83735 ASSAY OF MAGNESIUM: CPT

## 2020-05-07 PROCEDURE — 27000221 HC OXYGEN, UP TO 24 HOURS

## 2020-05-07 PROCEDURE — 25000003 PHARM REV CODE 250: Performed by: INTERNAL MEDICINE

## 2020-05-07 PROCEDURE — 80048 BASIC METABOLIC PNL TOTAL CA: CPT

## 2020-05-07 PROCEDURE — 25000003 PHARM REV CODE 250: Performed by: EMERGENCY MEDICINE

## 2020-05-07 PROCEDURE — 84100 ASSAY OF PHOSPHORUS: CPT | Mod: 91

## 2020-05-07 PROCEDURE — S0028 INJECTION, FAMOTIDINE, 20 MG: HCPCS | Performed by: INTERNAL MEDICINE

## 2020-05-07 PROCEDURE — 36600 WITHDRAWAL OF ARTERIAL BLOOD: CPT

## 2020-05-07 PROCEDURE — 63600175 PHARM REV CODE 636 W HCPCS: Performed by: STUDENT IN AN ORGANIZED HEALTH CARE EDUCATION/TRAINING PROGRAM

## 2020-05-07 PROCEDURE — 82330 ASSAY OF CALCIUM: CPT

## 2020-05-07 PROCEDURE — 80320 DRUG SCREEN QUANTALCOHOLS: CPT

## 2020-05-07 PROCEDURE — 83605 ASSAY OF LACTIC ACID: CPT

## 2020-05-07 PROCEDURE — 80048 BASIC METABOLIC PNL TOTAL CA: CPT | Mod: 91

## 2020-05-07 PROCEDURE — 81025 URINE PREGNANCY TEST: CPT | Performed by: EMERGENCY MEDICINE

## 2020-05-07 PROCEDURE — 31500 INSERT EMERGENCY AIRWAY: CPT

## 2020-05-07 PROCEDURE — 20000000 HC ICU ROOM

## 2020-05-07 PROCEDURE — U0002 COVID-19 LAB TEST NON-CDC: HCPCS

## 2020-05-07 PROCEDURE — 25000003 PHARM REV CODE 250

## 2020-05-07 PROCEDURE — 36415 COLL VENOUS BLD VENIPUNCTURE: CPT

## 2020-05-07 PROCEDURE — 36556 INSERT NON-TUNNEL CV CATH: CPT

## 2020-05-07 PROCEDURE — 84145 PROCALCITONIN (PCT): CPT

## 2020-05-07 PROCEDURE — 82803 BLOOD GASES ANY COMBINATION: CPT

## 2020-05-07 PROCEDURE — 82962 GLUCOSE BLOOD TEST: CPT

## 2020-05-07 PROCEDURE — 87205 SMEAR GRAM STAIN: CPT

## 2020-05-07 PROCEDURE — 99900035 HC TECH TIME PER 15 MIN (STAT)

## 2020-05-07 PROCEDURE — 80053 COMPREHEN METABOLIC PANEL: CPT

## 2020-05-07 PROCEDURE — 94644 CONT INHLJ TX 1ST HOUR: CPT

## 2020-05-07 PROCEDURE — 94002 VENT MGMT INPAT INIT DAY: CPT

## 2020-05-07 PROCEDURE — 87040 BLOOD CULTURE FOR BACTERIA: CPT | Mod: 59

## 2020-05-07 PROCEDURE — 30000890 LABCORP MISCELLANEOUS TEST

## 2020-05-07 PROCEDURE — 84484 ASSAY OF TROPONIN QUANT: CPT

## 2020-05-07 PROCEDURE — 93306 TTE W/DOPPLER COMPLETE: CPT

## 2020-05-07 PROCEDURE — 82550 ASSAY OF CK (CPK): CPT

## 2020-05-07 PROCEDURE — 93005 ELECTROCARDIOGRAM TRACING: CPT | Performed by: INTERNAL MEDICINE

## 2020-05-07 PROCEDURE — 99291 PR CRITICAL CARE, E/M 30-74 MINUTES: ICD-10-PCS | Mod: ,,, | Performed by: INTERNAL MEDICINE

## 2020-05-07 PROCEDURE — 83605 ASSAY OF LACTIC ACID: CPT | Mod: 91

## 2020-05-07 PROCEDURE — 94761 N-INVAS EAR/PLS OXIMETRY MLT: CPT

## 2020-05-07 PROCEDURE — 87070 CULTURE OTHR SPECIMN AEROBIC: CPT

## 2020-05-07 PROCEDURE — 87040 BLOOD CULTURE FOR BACTERIA: CPT

## 2020-05-07 RX ORDER — IPRATROPIUM BROMIDE 0.5 MG/2.5ML
1 SOLUTION RESPIRATORY (INHALATION)
Status: COMPLETED | OUTPATIENT
Start: 2020-05-07 | End: 2020-05-07

## 2020-05-07 RX ORDER — IBUPROFEN 200 MG
200 TABLET ORAL EVERY 6 HOURS PRN
COMMUNITY

## 2020-05-07 RX ORDER — GLUCAGON 1 MG
1 KIT INJECTION
Status: DISCONTINUED | OUTPATIENT
Start: 2020-05-07 | End: 2020-05-09 | Stop reason: HOSPADM

## 2020-05-07 RX ORDER — SODIUM CHLORIDE 9 MG/ML
1000 INJECTION, SOLUTION INTRAVENOUS
Status: COMPLETED | OUTPATIENT
Start: 2020-05-07 | End: 2020-05-07

## 2020-05-07 RX ORDER — MUPIROCIN 20 MG/G
OINTMENT TOPICAL 2 TIMES DAILY
Status: DISCONTINUED | OUTPATIENT
Start: 2020-05-07 | End: 2020-05-09 | Stop reason: HOSPADM

## 2020-05-07 RX ORDER — CHLORHEXIDINE GLUCONATE ORAL RINSE 1.2 MG/ML
15 SOLUTION DENTAL 2 TIMES DAILY
Status: DISCONTINUED | OUTPATIENT
Start: 2020-05-07 | End: 2020-05-09 | Stop reason: HOSPADM

## 2020-05-07 RX ORDER — ACETAMINOPHEN 325 MG/1
325 TABLET ORAL EVERY 6 HOURS PRN
COMMUNITY

## 2020-05-07 RX ORDER — MAGNESIUM SULFATE HEPTAHYDRATE 40 MG/ML
4 INJECTION, SOLUTION INTRAVENOUS
Status: DISCONTINUED | OUTPATIENT
Start: 2020-05-07 | End: 2020-05-09 | Stop reason: HOSPADM

## 2020-05-07 RX ORDER — ENOXAPARIN SODIUM 100 MG/ML
40 INJECTION SUBCUTANEOUS EVERY 12 HOURS
Status: DISCONTINUED | OUTPATIENT
Start: 2020-05-07 | End: 2020-05-09 | Stop reason: HOSPADM

## 2020-05-07 RX ORDER — MAGNESIUM SULFATE HEPTAHYDRATE 40 MG/ML
2 INJECTION, SOLUTION INTRAVENOUS
Status: DISCONTINUED | OUTPATIENT
Start: 2020-05-07 | End: 2020-05-09 | Stop reason: HOSPADM

## 2020-05-07 RX ORDER — POTASSIUM CHLORIDE 7.45 MG/ML
60 INJECTION INTRAVENOUS
Status: DISCONTINUED | OUTPATIENT
Start: 2020-05-07 | End: 2020-05-09 | Stop reason: HOSPADM

## 2020-05-07 RX ORDER — IBUPROFEN 200 MG
24 TABLET ORAL
Status: DISCONTINUED | OUTPATIENT
Start: 2020-05-07 | End: 2020-05-09 | Stop reason: HOSPADM

## 2020-05-07 RX ORDER — VANCOMYCIN HCL IN 5 % DEXTROSE 1G/250ML
1000 PLASTIC BAG, INJECTION (ML) INTRAVENOUS ONCE
Status: DISCONTINUED | OUTPATIENT
Start: 2020-05-07 | End: 2020-05-07

## 2020-05-07 RX ORDER — ACETAMINOPHEN 650 MG/20.3ML
650 LIQUID ORAL EVERY 6 HOURS
Status: DISCONTINUED | OUTPATIENT
Start: 2020-05-07 | End: 2020-05-08

## 2020-05-07 RX ORDER — POTASSIUM CHLORIDE 7.45 MG/ML
40 INJECTION INTRAVENOUS
Status: DISCONTINUED | OUTPATIENT
Start: 2020-05-07 | End: 2020-05-09 | Stop reason: HOSPADM

## 2020-05-07 RX ORDER — BUSPIRONE HYDROCHLORIDE 5 MG/1
30 TABLET ORAL ONCE
Status: DISCONTINUED | OUTPATIENT
Start: 2020-05-07 | End: 2020-05-08

## 2020-05-07 RX ORDER — FAMOTIDINE 10 MG/ML
20 INJECTION INTRAVENOUS 2 TIMES DAILY
Status: DISCONTINUED | OUTPATIENT
Start: 2020-05-07 | End: 2020-05-09 | Stop reason: HOSPADM

## 2020-05-07 RX ORDER — SODIUM CHLORIDE 0.9 % (FLUSH) 0.9 %
10 SYRINGE (ML) INJECTION
Status: DISCONTINUED | OUTPATIENT
Start: 2020-05-07 | End: 2020-05-09 | Stop reason: HOSPADM

## 2020-05-07 RX ORDER — INSULIN ASPART 100 [IU]/ML
1-10 INJECTION, SOLUTION INTRAVENOUS; SUBCUTANEOUS
Status: DISCONTINUED | OUTPATIENT
Start: 2020-05-07 | End: 2020-05-09 | Stop reason: HOSPADM

## 2020-05-07 RX ORDER — IBUPROFEN 200 MG
16 TABLET ORAL
Status: DISCONTINUED | OUTPATIENT
Start: 2020-05-07 | End: 2020-05-09 | Stop reason: HOSPADM

## 2020-05-07 RX ORDER — LIDOCAINE HYDROCHLORIDE 10 MG/ML
INJECTION, SOLUTION EPIDURAL; INFILTRATION; INTRACAUDAL; PERINEURAL
Status: COMPLETED
Start: 2020-05-07 | End: 2020-05-07

## 2020-05-07 RX ORDER — LEVALBUTEROL 1.25 MG/.5ML
3.75 SOLUTION, CONCENTRATE RESPIRATORY (INHALATION)
Status: COMPLETED | OUTPATIENT
Start: 2020-05-07 | End: 2020-05-07

## 2020-05-07 RX ORDER — LORAZEPAM 2 MG/ML
2 INJECTION INTRAMUSCULAR
Status: DISCONTINUED | OUTPATIENT
Start: 2020-05-07 | End: 2020-05-09 | Stop reason: HOSPADM

## 2020-05-07 RX ORDER — POTASSIUM CHLORIDE 7.45 MG/ML
20 INJECTION INTRAVENOUS
Status: DISCONTINUED | OUTPATIENT
Start: 2020-05-07 | End: 2020-05-09 | Stop reason: HOSPADM

## 2020-05-07 RX ADMIN — MUPIROCIN: 20 OINTMENT TOPICAL at 11:05

## 2020-05-07 RX ADMIN — SODIUM CHLORIDE 1000 ML: 0.9 INJECTION, SOLUTION INTRAVENOUS at 09:05

## 2020-05-07 RX ADMIN — VANCOMYCIN HYDROCHLORIDE 2000 MG: 1 INJECTION, POWDER, LYOPHILIZED, FOR SOLUTION INTRAVENOUS at 03:05

## 2020-05-07 RX ADMIN — LEVALBUTEROL HYDROCHLORIDE 3.75 MG: 1.25 SOLUTION, CONCENTRATE RESPIRATORY (INHALATION) at 09:05

## 2020-05-07 RX ADMIN — ENOXAPARIN SODIUM 40 MG: 100 INJECTION SUBCUTANEOUS at 08:05

## 2020-05-07 RX ADMIN — FAMOTIDINE 20 MG: 10 INJECTION INTRAVENOUS at 08:05

## 2020-05-07 RX ADMIN — IPRATROPIUM BROMIDE 1 MG: 0.5 SOLUTION RESPIRATORY (INHALATION) at 09:05

## 2020-05-07 RX ADMIN — PIPERACILLIN AND TAZOBACTAM 4.5 G: 4; .5 INJECTION, POWDER, FOR SOLUTION INTRAVENOUS at 12:05

## 2020-05-07 RX ADMIN — CHLORHEXIDINE GLUCONATE 15 ML: 1.2 RINSE ORAL at 08:05

## 2020-05-07 RX ADMIN — MAGNESIUM SULFATE 2 G: 2 INJECTION INTRAVENOUS at 03:05

## 2020-05-07 RX ADMIN — LIDOCAINE HYDROCHLORIDE: 10 INJECTION, SOLUTION EPIDURAL; INFILTRATION; INTRACAUDAL; PERINEURAL at 10:05

## 2020-05-07 RX ADMIN — PIPERACILLIN AND TAZOBACTAM 4.5 G: 4; .5 INJECTION, POWDER, FOR SOLUTION INTRAVENOUS at 08:05

## 2020-05-07 NOTE — ASSESSMENT & PLAN NOTE
· Continue lung protective ventilation for now.  · Empiric antibiotics out of an abundance of caution.

## 2020-05-07 NOTE — ASSESSMENT & PLAN NOTE
· Empiric broad-spectrum antibiotics out of an abundance of caution.  · Surveillance cultures obtained.  Will deescalate as culture data becomes available.

## 2020-05-07 NOTE — ED TRIAGE NOTES
Admit per [facility] to ER Tr1, 43 yo female found vomiting, unresponsive by  at appx 0715. Bystander CPR according to EMS. Asystole upon EMS arrival. Stayed asystole for 20 minutes. Intubated with 7.0 ET tube 23cm at the teeth, IO to right Tibia. Received 4 Epi and 2 Narcan

## 2020-05-07 NOTE — NURSING
GCS 3 called to [facility]. See flowsheet for referral number.  Kiesha Kovacs RN  5/7/2020

## 2020-05-07 NOTE — ED NOTES
Patient placed on portable cardiac monitor and transferred with RN to CT, will trasnfer to ICU afterwards. No change from previous assessment.

## 2020-05-07 NOTE — ED NOTES
Per Dr. Szymanski, ok to wait for head CT for patient admit, do not need to move patient at this time. SBP 90's at this time.

## 2020-05-07 NOTE — LOPA/MORA/SWTA/AOC/AEB
[facility] ([facility])  On-Site Evaluation  [facility] Contact # [phone]        Thank you for the referral of this patient to determine suitability for organ, tissue, and eye donation.  A chart review has been conducted (date): 05/07/2020  at (time)  15:52 .  [facility] findings are as follows:    ? Potential candidate for organ donation - [facility] following patient. Any changes in patients condition, discussion of withdrawing the vent or brain death exams, or family mention of donation immediately call [phone].    ? Potential for candidate for tissue and eye donation- call [facility]ONIE at [phone] within 2 hours of death for screening as a potential tissue and/or eye donor.      ?      [facility] Representative:  Rosy Watson RN/       [facility] Referral Number:   [id number]

## 2020-05-07 NOTE — CARE UPDATE
This note also relates to the following rows which could not be included:  SpO2 - Cannot attach notes to unvalidated device data  Pulse - Cannot attach notes to unvalidated device data  Resp - Cannot attach notes to unvalidated device data  BP - Cannot attach notes to unvalidated device data       05/07/20 1411   Labs   $ Was an ABG obtained? Arterial Puncture;ISTAT - Blood gas;ISTAT - Hematocrit;ISTAT - Calcium;ISTAT - Potassium;ISTAT - PH, Blood;ISTAT - Sodium   $ Labs Tech Time 15 min   Critical Value Communication   Date Result Received 05/07/20   Time Result Received 1411   Resulting Department of Critical Value resp   Who communicated critical value from resulting department? RT PC   Critical Test #1 no criticals   Doctor not notified of critical value due to: per physician order   Date Notified 05/07/20

## 2020-05-07 NOTE — CONSULTS
[facility]  Cardiology  Consult Note    Patient Name: Chelsea Solano  MRN: [id number]  Admission Date: 5/7/2020  Hospital Length of Stay: 0 days  Code Status: Full Code   Attending Provider: Kaitlin Herrera DO   Consulting Provider: Amarilys Duron MD  Primary Care Physician: Bertha Mcneil NP  Principal Problem:Cardiac arrest    Patient information was obtained from past medical records and ER records.     Consults  Subjective:     Chief Complaint:  Cardiac arrest     HPI:  44-year-old lady found by her spouse unconscious on the floor, pulseless in a pool of vomitus.  He initiated CPR and called the EMS.  After about 20 min of resuscitation pulse was obtained and she was transferred to [facility] where she was reintubated.  Patient's  is presently not available.  She has cardiac history she is on metoprolol 50 mg twice daily and has a implantable loop recorder.  She has history of ADHD and home medications include clonazepam and Seroquel.  Her urine tox screen demonstrates some positive for opiates and benzodiazepine.  No history of any coronary artery disease is forthcoming.    Past Medical History:   Diagnosis Date    ADHD (attention deficit hyperactivity disorder)     Anxiety     Bipolar 2 disorder     Depression     Diabetes mellitus     Hypertension 3/16/2013    Kidney stones March 2014    Migraine headache     TIA (transient ischemic attack)     UTI (urinary tract infection) March 2014       Past Surgical History:   Procedure Laterality Date    CHOLECYSTECTOMY      HYSTERECTOMY      INSERTION OF IMPLANTABLE LOOP RECORDER      KNEE SURGERY  2003    WRIST SURGERY  2001       Review of patient's allergies indicates:   Allergen Reactions    Toradol [ketorolac] Hives    Nsaids (non-steroidal anti-inflammatory drug)     Ondansetron Hives    Stadol [butorphanol tartrate] Other (See Comments)     hallucinations       No current  facility-administered medications on file prior to encounter.      Current Outpatient Medications on File Prior to Encounter   Medication Sig    acetaminophen (TYLENOL) 325 MG tablet Take 325 mg by mouth every 6 (six) hours as needed for Pain.    albuterol (PROVENTIL/VENTOLIN HFA) 90 mcg/actuation inhaler Inhale 2 puffs into the lungs as needed.    albuterol-ipratropium (DUO-NEB) 2.5 mg-0.5 mg/3 mL nebulizer solution Take 3 mLs by nebulization every 6 (six) hours as needed for Wheezing. Rescue    clonazePAM (KLONOPIN) 2 MG Tab Take 2 mg by mouth 3 (three) times daily as needed.    cyclobenzaprine (FLEXERIL) 10 MG tablet Take 10 mg by mouth 3 (three) times daily as needed for Muscle spasms.    escitalopram oxalate (LEXAPRO) 10 MG tablet Take 1 tablet (10 mg total) by mouth once daily.    ibuprofen (ADVIL,MOTRIN) 200 MG tablet Take 200 mg by mouth every 6 (six) hours as needed for Pain.    lidocaine (LIDODERM) 5 % Place 1 patch onto the skin every 24 hours. Remove & Discard patch within 12 hours or as directed by MD    loperamide (IMODIUM A-D) 2 mg Tab Take 2 mg by mouth 2 (two) times daily.    melatonin (MELATIN) Take 5 mg by mouth daily as needed for Insomnia.    methocarbamol (ROBAXIN) 500 MG Tab Take 500 mg by mouth 3 (three) times daily.    metoprolol tartrate (LOPRESSOR) 50 MG tablet Take 1 tablet (50 mg total) by mouth 2 (two) times daily.    pantoprazole (PROTONIX) 20 MG tablet Take 1 tablet (20 mg total) by mouth once daily.    pramipexole 1.5 mg Tb24 Take 1.5 mg by mouth every evening.    quetiapine (SEROQUEL) 400 MG tablet Take 400 mg by mouth once daily at 6am.    zolpidem (AMBIEN) 10 mg Tab Take 10 mg by mouth daily as needed.    dextroamphetamine-amphetamine 30 mg Tab Take 1 tablet by mouth daily as needed.     Family History     Problem Relation (Age of Onset)    Cancer Father, Maternal Grandmother    Diabetes Father    Heart disease Mother, Father    Hypertension Mother     Schizophrenia Mother        Tobacco Use    Smoking status: Former Smoker     Packs/day: 1.00     Years: 20.00     Pack years: 20.00     Last attempt to quit: 3/11/2012     Years since quittin.1    Smokeless tobacco: Never Used   Substance and Sexual Activity    Alcohol use: Yes     Alcohol/week: 0.0 standard drinks    Drug use: Yes     Types: Hydrocodone    Sexual activity: Yes     Partners: Male     Birth control/protection: None     ROS  Objective:     Vital Signs (Most Recent):  Temp: (!) 94.6 °F (34.8 °C) (20 1645)  Pulse: 65 (20 1645)  Resp: (!) 28 (20 1645)  BP: 91/62 (20 1645)  SpO2: 96 % (20 164) Vital Signs (24h Range):  Temp:  [91.4 °F (33 °C)-97.3 °F (36.3 °C)] 94.6 °F (34.8 °C)  Pulse:  [61-89] 65  Resp:  [14-34] 28  SpO2:  [92 %-99 %] 96 %  BP: ()/(39-83) 91/62     Weight: 106.3 kg (234 lb 5.6 oz)  Body mass index is 40.23 kg/m².    SpO2: 96 %  O2 Device (Oxygen Therapy): ventilator      Intake/Output Summary (Last 24 hours) at 2020 1710  Last data filed at 2020 1600  Gross per 24 hour   Intake 2000 ml   Output 435 ml   Net 1565 ml       Lines/Drains/Airways     Drain                 NG/OG Tube 20 0905 orogastric 16 Fr. Center mouth less than 1 day         Urethral Catheter 20 0915 Non-latex 16 Fr. less than 1 day          Airway                 Airway - Non-Surgical 20 Endotracheal Tube less than 1 day          Peripheral Intravenous Line                 Midline Catheter Insertion/Assessment  - Single Lumen 20 1010 Right cephalic vein (lateral side of arm) 20g x 6cm less than 1 day         Peripheral IV - Single Lumen 20 0910 20 G Right Forearm less than 1 day                Physical Exam patient on ventilator.  Vital signs appear to be stable.  She is unresponsive.  Heart sounds are normal no cardiac murmur or gallop.  ECG shows sinus rhythm left atrial enlargement no acute ST-T changes of ischemia    Significant Labs:    CMP   Recent Labs   Lab 05/07/20  0911 05/07/20  1230 05/07/20  1424    140 142   K 4.6 3.5 3.3*   CL 98 107 106   CO2 25 24 24   * 153*  153* 183*  183*  183*   BUN 16 19 18   CREATININE 1.2 0.9 0.9   CALCIUM 8.7 8.5* 8.7   PROT 6.8 7.0  --    ALBUMIN 3.5 3.7  --    BILITOT 0.6 0.4  --    ALKPHOS 274* 269*  --    * 129*  --    ALT 60* 67*  --    ANIONGAP 14 9 12   ESTGFRAFRICA >60.0 >60.0 >60.0   EGFRNONAA 54.7* >60.0 >60.0   , CBC   Recent Labs   Lab 05/07/20  0911 05/07/20  1411   WBC 10.25  --    HGB 14.2  --    HCT 47.8 49     --     and Troponin   Recent Labs   Lab 05/07/20  0911 05/07/20  1230   TROPONINI <0.030 0.031       Significant Imaging:  Chest x-ray shows right upper zone opacification.  There is a implantable loop recorder on the left side of the chest.  Assessment and Plan:  This does not up appear to be a primary cardiac event.  Her ECG appears normal and the troponins are coming out in the normal range.  Will get more information from her previous cardiologist.  Echocardiogram shows normal left ventricular function and no wall motion abnormality.  Recommend supportive care and hypothermia.  If there is evidence of tachyarrhythmia can resume a metoprolol.     Active Diagnoses:    Diagnosis Date Noted POA    PRINCIPAL PROBLEM:  Cardiac arrest [I46.9] 05/07/2020 Yes    Cerebral edema [G93.6] 05/07/2020 Yes    Acute hypoxemic respiratory failure [J96.01] 05/07/2020 Yes    Aspiration into lower respiratory tract [T17.800A] 05/07/2020 Yes    Polypharmacy [Z79.899] 05/07/2020 Not Applicable    Opiate overdose [T40.601A] 05/07/2020 Yes    Benzodiazepine overdose [T42.4X1A] 05/07/2020 Yes    Gastroesophageal reflux disease without esophagitis [K21.9] 02/24/2020 Yes    Irritable bowel syndrome with diarrhea [K58.0] 02/24/2020 Yes    Moderate episode of recurrent major depressive disorder [F33.1] 02/24/2020 Yes    Diabetes [E11.9] 03/16/2014 Yes    Anxiety [F41.9]  01/07/2014 Yes    Mood disorder [F39] 01/07/2014 Yes    Insomnia [G47.00] 03/25/2013 Yes    Transaminitis [R74.0] 03/25/2013 Yes    Hypertension [I10] 03/16/2013 Yes      Problems Resolved During this Admission:       VTE Risk Mitigation (From admission, onward)         Ordered     enoxaparin injection 40 mg  Every 12 hours      05/07/20 1134     IP VTE HIGH RISK PATIENT  Once      05/07/20 1134     Place sequential compression device  Until discontinued      05/07/20 1134                Thank you for your consult. I will follow-up with patient. Please contact us if you have any additional questions.    Amarilys Duron MD  Cardiology   [facility]

## 2020-05-07 NOTE — SUBJECTIVE & OBJECTIVE
Past Medical History:   Diagnosis Date    ADHD (attention deficit hyperactivity disorder)     Anxiety     Bipolar 2 disorder     Depression     Diabetes mellitus     Hypertension 3/16/2013    Kidney stones March 2014    Migraine headache     TIA (transient ischemic attack)     UTI (urinary tract infection) March 2014       Past Surgical History:   Procedure Laterality Date    CHOLECYSTECTOMY      HYSTERECTOMY      INSERTION OF IMPLANTABLE LOOP RECORDER      KNEE SURGERY  2003    WRIST SURGERY  2001       Review of patient's allergies indicates:   Allergen Reactions    Toradol [ketorolac] Hives    Nsaids (non-steroidal anti-inflammatory drug)     Ondansetron Hives    Stadol [butorphanol tartrate] Other (See Comments)     hallucinations       No current facility-administered medications on file prior to encounter.      Current Outpatient Medications on File Prior to Encounter   Medication Sig    acetaminophen (TYLENOL) 325 MG tablet Take 325 mg by mouth every 6 (six) hours as needed for Pain.    albuterol (PROVENTIL/VENTOLIN HFA) 90 mcg/actuation inhaler Inhale 2 puffs into the lungs as needed.    albuterol-ipratropium (DUO-NEB) 2.5 mg-0.5 mg/3 mL nebulizer solution Take 3 mLs by nebulization every 6 (six) hours as needed for Wheezing. Rescue    clonazePAM (KLONOPIN) 2 MG Tab Take 2 mg by mouth 3 (three) times daily as needed.    cyclobenzaprine (FLEXERIL) 10 MG tablet Take 10 mg by mouth 3 (three) times daily as needed for Muscle spasms.    escitalopram oxalate (LEXAPRO) 10 MG tablet Take 1 tablet (10 mg total) by mouth once daily.    ibuprofen (ADVIL,MOTRIN) 200 MG tablet Take 200 mg by mouth every 6 (six) hours as needed for Pain.    lidocaine (LIDODERM) 5 % Place 1 patch onto the skin every 24 hours. Remove & Discard patch within 12 hours or as directed by MD    loperamide (IMODIUM A-D) 2 mg Tab Take 2 mg by mouth 2 (two) times daily.    melatonin (MELATIN) Take 5 mg by mouth daily as  needed for Insomnia.    methocarbamol (ROBAXIN) 500 MG Tab Take 500 mg by mouth 3 (three) times daily.    metoprolol tartrate (LOPRESSOR) 50 MG tablet Take 1 tablet (50 mg total) by mouth 2 (two) times daily.    pantoprazole (PROTONIX) 20 MG tablet Take 1 tablet (20 mg total) by mouth once daily.    pramipexole 1.5 mg Tb24 Take 1.5 mg by mouth every evening.    quetiapine (SEROQUEL) 400 MG tablet Take 400 mg by mouth once daily at 6am.    zolpidem (AMBIEN) 10 mg Tab Take 10 mg by mouth daily as needed.    dextroamphetamine-amphetamine 30 mg Tab Take 1 tablet by mouth daily as needed.     Family History     Problem Relation (Age of Onset)    Cancer Father, Maternal Grandmother    Diabetes Father    Heart disease Mother, Father    Hypertension Mother    Schizophrenia Mother        Tobacco Use    Smoking status: Former Smoker     Packs/day: 1.00     Years: 20.00     Pack years: 20.00     Last attempt to quit: 3/11/2012     Years since quittin.1    Smokeless tobacco: Never Used   Substance and Sexual Activity    Alcohol use: Yes     Alcohol/week: 0.0 standard drinks    Drug use: Yes     Types: Hydrocodone    Sexual activity: Yes     Partners: Male     Birth control/protection: None     Review of Systems   Unable to perform ROS: Intubated     Objective:     Vital Signs (Most Recent):  Temp: (!) 94.3 °F (34.6 °C) (20 1630)  Pulse: 63 (20 1630)  Resp: (!) 28 (20 1630)  BP: (!) 90/59 (20 1630)  SpO2: 96 % (20 1630) Vital Signs (24h Range):  Temp:  [91.4 °F (33 °C)-97.3 °F (36.3 °C)] 94.3 °F (34.6 °C)  Pulse:  [61-89] 63  Resp:  [14-34] 28  SpO2:  [92 %-99 %] 96 %  BP: ()/(39-83) 90/59     Weight: 106.3 kg (234 lb 5.6 oz)  Body mass index is 40.23 kg/m².    Physical Exam   Constitutional:   Obese   HENT:   Head: Normocephalic and atraumatic.   Et tube in place   Negative gag   Eyes: Conjunctivae are normal.   3mm pupils   No responsive to light     Neck: Neck supple. No JVD  present. No thyromegaly present.   Cardiovascular: Normal rate, regular rhythm and normal heart sounds. Exam reveals no gallop and no friction rub.   No murmur heard.  Pulmonary/Chest: She has no wheezes. She has no rales.   Intubated and ventilated    Abdominal: Soft. Bowel sounds are normal. She exhibits no distension. There is no tenderness.   Genitourinary:   Genitourinary Comments: booth   Musculoskeletal: She exhibits no edema or deformity.   Neurological: She has normal reflexes.   gcs 3  Unresponsive to painful stimuli     Skin: Skin is warm and dry. She is not diaphoretic.   Nursing note and vitals reviewed.          Significant Labs:   covid -19 negative  Urine drug screen + benzo and opiates  CBC:   Recent Labs   Lab 05/07/20  0911 05/07/20  1411   WBC 10.25  --    HGB 14.2  --    HCT 47.8 49     --      CMP:   Recent Labs   Lab 05/07/20  0911 05/07/20  1230 05/07/20  1424    140 142   K 4.6 3.5 3.3*   CL 98 107 106   CO2 25 24 24   * 153*  153* 183*  183*  183*   BUN 16 19 18   CREATININE 1.2 0.9 0.9   CALCIUM 8.7 8.5* 8.7   PROT 6.8 7.0  --    ALBUMIN 3.5 3.7  --    BILITOT 0.6 0.4  --    ALKPHOS 274* 269*  --    * 129*  --    ALT 60* 67*  --    ANIONGAP 14 9 12   EGFRNONAA 54.7* >60.0 >60.0     Lactic Acid:   Recent Labs   Lab 05/07/20  1010 05/07/20  1425   LACTATE 2.3* 2.3*     Troponin:   Recent Labs   Lab 05/07/20  0911 05/07/20  1230   TROPONINI <0.030 0.031   Urine Studies:   Recent Labs   Lab 05/07/20  0918   COLORU Yellow   APPEARANCEUA Clear   PHUR 6.0   SPECGRAV 1.020   PROTEINUA 1+*   GLUCUA Trace*   KETONESU Negative   BILIRUBINUA Negative   OCCULTUA Negative   NITRITE Negative   UROBILINOGEN 2.0-3.0*   LEUKOCYTESUR Negative   RBCUA 1   WBCUA 3   BACTERIA Negative   SQUAMEPITHEL 4   HYALINECASTS 19*     All pertinent labs within the past 24 hours have been reviewed.    Significant Imaging: I have reviewed all pertinent imaging results/findings within the past  24 hours.   Imaging Results          CT Head Without Contrast (Final result)  Result time 05/07/20 13:19:26    Final result by Ethan Mccoy MD (05/07/20 13:19:26)                 Impression:      Markedly abnormal appearing exam as above and findings of global hypoxic ischemic injury (anoxic brain injury), with diffuse cerebral edema (with effacement of the saw slightly, suprasellar/basilar cisterns and foramen magnum).    RESULT NOTIFICATION: These observations were discussed by the dictating physician, by phone with, and acknowledged by Demetria KIRBY to be communicated to glo Herrera at 13:13 on 5/7/2020.      Electronically signed by: Ethan Mccoy MD  Date:    05/07/2020  Time:    13:19             Narrative:    CLINICAL HISTORY:  ([id number] y/o  (1975) F    Confusion/delirium, altered LOC, unexplained;    TECHNIQUE:  (A#[id number], exam time 5/7/2020 13:08)    CT HEAD WITHOUT CONTRAST [id number]    Axial CT of the brain without contrast using soft tissue and bone algorithm. Please note in the acute setting if there is a clinical concern for an acute stroke MRI would be more sensitive/specific for evaluation of ischemia.    CMS MANDATED QUALITY DATA - CT RADIATION - 436    All CT scans at this facility utilize dose modulation, iterative reconstruction, and/or weight based dosing when appropriate to reduce radiation dose to as low as reasonably achievable.    COMPARISON:  None available.    FINDINGS:  Markedly abnormal appearing exam with diffuse loss of the normal gray-white differentiation throughout the cerebral and cerebellar hemispheres.  There is loss of the normal sulcation pattern throughout the cerebral and cerebellar hemispheres with effacement of the suprasellar and basilar cisterns as well as the foramen magnum.  No gross acute intracranial hemorrhage is identified (noting that the tentorium in sulci are bright compared to the adjacent edematous cerebral and cerebellar parenchyma), no  midline shift or hydrocephalus is seen.  There are tiny air-fluid levels in both maxillary sinuses.  Scattered areas of mucosal thickening throughout the mid ethmoid air cells and maxillary sinuses, consider correlation for acute sinusitis.                               X-Ray Chest 1 View (Final result)  Result time 05/07/20 10:05:45    Final result by Davis Ortega MD (05/07/20 10:05:45)                 Narrative:    XR CHEST 1 VIEW    CLINICAL HISTORY:  44 years Female    COMPARISON: May 7, 2020    FINDINGS: Cardiomediastinal silhouette is stable from prior. Tip of  endotracheal tube is located at the level of the ari and should be  retracted for optimal positioning. Enteric tube has been repositioned,  with tip now appropriately located within the stomach. Loop recorder  projects over the left chest. Right perihilar and medial right upper  lobe airspace disease persists. No pleural fluid. No pneumothorax.    IMPRESSION:    1. Enteric tube is now in appropriate position.  2. ET tube tip remains at the level of the ari and should be  retracted.  3. Stable right parahilar and medial right upper lobe airspace disease  concerning for pneumonia.    Electronically Signed by Davis MOTA on 5/7/2020 10:10 AM                             X-Ray Chest AP Portable (Final result)  Result time 05/07/20 09:30:36    Final result by Hugo Herrera MD (05/07/20 09:30:36)                 Narrative:    REASON: Cardiac Arrest    TECHNIQUE: AP radiograph of the chest    COMPARISON: Chest x-ray March 11, 2020.    FINDINGS:    The cardiomediastinal silhouette is within normal limits in size. A  loop recorder device is again noted. Endotracheal tube identified with  tip at the level of the ari. Nasogastric tube is coiled within the  esophagus, and the tip is directed cephalad. Right lung perihilar and  upper lung hazy ill-defined opacification noted. The left lung is  clear. No acute osseous  abnormality.    IMPRESSION:    1.  Endotracheal tube identified with tip at the level of the  ari. Recommend retraction of at least 3 cm.  2.  Nasogastric tube is coiled within the esophagus in the tip is  directed cephalad.  3.  Right perihilar and upper lung hazy ill-defined opacification.  Findings likely reflect an infectious process and/or atelectasis.    Electronically Signed by Hugo Herrera on 5/7/2020 9:33 AM

## 2020-05-07 NOTE — ASSESSMENT & PLAN NOTE
· No acute issues.  · Appears that she is developing diabetes insipidus.  · Start free water infusion.

## 2020-05-07 NOTE — Clinical Note
Catheter is repositioned to the ostium   right coronary artery. Angiography performed of the right coronary arteries in multiple views. Angiography performed via power injection with 6 mL contrast at 3 mL/s. Used JR4 Catheter

## 2020-05-07 NOTE — PROCEDURES
EEG Report    Patient name: Chelsea Solano      73    Date of Service: 20    Duration: 30 minutes    Requested By: TERRI Augero Physician: Jesse Casper M.D.        Reason for Study: Encephalopathy.         Clinical History: 43yo woman who presented in out of hospital cardiac arrest.      AED/sedation: none  Temperature: 95.5F to 95.8F during the study  BP: 88/57    Exam Notes:  The recording was performed with the standard 10-20 electrode placement with additional ECG electrodes.     Summary:    The background rhythm is diffusely suppressed. There is no clear evidence of cortical activity despite lowering the sensitivity to 3 microvolts. EKG artifact and photic electrical artifact are present.    Photic stimulation is performed with no abnormal reactivity noted. Hyperventilation is not performed.    No epileptiform discharges or seizures are captured.      Impression:  This is an abnormal EEG due to diffuse background suppression with no clear evidence of cortical activity.     This finding could potentially be consistent with electrocerebral inactivity, but the study is not sufficient to make this determination due to the presence of confounding factors (hypothermia and hypotension) as well as the lack of use of a specific brain death protocol during the performance of this study.

## 2020-05-07 NOTE — H&P
[facility] Medicine  History & Physical    Patient Name: Chelsea Solano  MRN: [id number]  Admission Date: 5/7/2020  Attending Physician: Kaitlin Herrera DO   Primary Care Provider: Bertha Mcneil NP         Patient information was obtained from spouse/SO, past medical records and ER records.     Subjective:     Principal Problem:Cardiac arrest    Chief Complaint:   Chief Complaint   Patient presents with    Cardiac Arrest        HPI: 44-year-old female with past medical history of diabetes, hypertension and multiple psychiatric diagnosis including ADHD, bipolar, major depressive disorder brought in by EMS following cardiac arrest in field.  History obtained from patient's  and ED physician.  Per  patient was in usual state of health and last known normal around 530-600 a.m. this morning.  They returned to sleep and  woke up at around 7:15 a.m. and noticed that patient was unresponsive, pulseless and covered in vomit.  He began CPR and notified EMS. On EMS arrival, ACLS continued and patient received epi x 4 and Narcan with ROSC achieved after about 20 min..  Patient was intubated in the field.    In ED,  re-intubated 2/2 issues ventilating patient. CXR right upper lobe infection vs atelectasis, CT head obtained showing global hypoxic ischemic injury and diffuse cerebral edema.  Utox + opiates and benzos.   Per , patient took her prescribed nighttime medications.  He initially told ER physician that patient had voiced suicidal ideation about 1 week ago but when discussed again he denied.     Past Medical History:   Diagnosis Date    ADHD (attention deficit hyperactivity disorder)     Anxiety     Bipolar 2 disorder     Depression     Diabetes mellitus     Hypertension 3/16/2013    Kidney stones March 2014    Migraine headache     TIA (transient ischemic attack)     UTI (urinary tract infection) March 2014       Past Surgical History:    Procedure Laterality Date    CHOLECYSTECTOMY      HYSTERECTOMY      INSERTION OF IMPLANTABLE LOOP RECORDER      KNEE SURGERY  2003    WRIST SURGERY  2001       Review of patient's allergies indicates:   Allergen Reactions    Toradol [ketorolac] Hives    Nsaids (non-steroidal anti-inflammatory drug)     Ondansetron Hives    Stadol [butorphanol tartrate] Other (See Comments)     hallucinations       No current facility-administered medications on file prior to encounter.      Current Outpatient Medications on File Prior to Encounter   Medication Sig    acetaminophen (TYLENOL) 325 MG tablet Take 325 mg by mouth every 6 (six) hours as needed for Pain.    albuterol (PROVENTIL/VENTOLIN HFA) 90 mcg/actuation inhaler Inhale 2 puffs into the lungs as needed.    albuterol-ipratropium (DUO-NEB) 2.5 mg-0.5 mg/3 mL nebulizer solution Take 3 mLs by nebulization every 6 (six) hours as needed for Wheezing. Rescue    clonazePAM (KLONOPIN) 2 MG Tab Take 2 mg by mouth 3 (three) times daily as needed.    cyclobenzaprine (FLEXERIL) 10 MG tablet Take 10 mg by mouth 3 (three) times daily as needed for Muscle spasms.    escitalopram oxalate (LEXAPRO) 10 MG tablet Take 1 tablet (10 mg total) by mouth once daily.    ibuprofen (ADVIL,MOTRIN) 200 MG tablet Take 200 mg by mouth every 6 (six) hours as needed for Pain.    lidocaine (LIDODERM) 5 % Place 1 patch onto the skin every 24 hours. Remove & Discard patch within 12 hours or as directed by MD    loperamide (IMODIUM A-D) 2 mg Tab Take 2 mg by mouth 2 (two) times daily.    melatonin (MELATIN) Take 5 mg by mouth daily as needed for Insomnia.    methocarbamol (ROBAXIN) 500 MG Tab Take 500 mg by mouth 3 (three) times daily.    metoprolol tartrate (LOPRESSOR) 50 MG tablet Take 1 tablet (50 mg total) by mouth 2 (two) times daily.    pantoprazole (PROTONIX) 20 MG tablet Take 1 tablet (20 mg total) by mouth once daily.    pramipexole 1.5 mg Tb24 Take 1.5 mg by mouth every  evening.    quetiapine (SEROQUEL) 400 MG tablet Take 400 mg by mouth once daily at 6am.    zolpidem (AMBIEN) 10 mg Tab Take 10 mg by mouth daily as needed.    dextroamphetamine-amphetamine 30 mg Tab Take 1 tablet by mouth daily as needed.     Family History     Problem Relation (Age of Onset)    Cancer Father, Maternal Grandmother    Diabetes Father    Heart disease Mother, Father    Hypertension Mother    Schizophrenia Mother        Tobacco Use    Smoking status: Former Smoker     Packs/day: 1.00     Years: 20.00     Pack years: 20.00     Last attempt to quit: 3/11/2012     Years since quittin.1    Smokeless tobacco: Never Used   Substance and Sexual Activity    Alcohol use: Yes     Alcohol/week: 0.0 standard drinks    Drug use: Yes     Types: Hydrocodone    Sexual activity: Yes     Partners: Male     Birth control/protection: None     Review of Systems   Unable to perform ROS: Intubated     Objective:     Vital Signs (Most Recent):  Temp: (!) 94.3 °F (34.6 °C) (20 1630)  Pulse: 63 (20 1630)  Resp: (!) 28 (20 1630)  BP: (!) 90/59 (20 1630)  SpO2: 96 % (20 1630) Vital Signs (24h Range):  Temp:  [91.4 °F (33 °C)-97.3 °F (36.3 °C)] 94.3 °F (34.6 °C)  Pulse:  [61-89] 63  Resp:  [14-34] 28  SpO2:  [92 %-99 %] 96 %  BP: ()/(39-83) 90/59     Weight: 106.3 kg (234 lb 5.6 oz)  Body mass index is 40.23 kg/m².    Physical Exam   Constitutional:   Obese   HENT:   Head: Normocephalic and atraumatic.   Et tube in place   Negative gag   Eyes: Conjunctivae are normal.   3mm pupils   No responsive to light     Neck: Neck supple. No JVD present. No thyromegaly present.   Cardiovascular: Normal rate, regular rhythm and normal heart sounds. Exam reveals no gallop and no friction rub.   No murmur heard.  Pulmonary/Chest: She has no wheezes. She has no rales.   Intubated and ventilated    Abdominal: Soft. Bowel sounds are normal. She exhibits no distension. There is no tenderness.    Genitourinary:   Genitourinary Comments: booth   Musculoskeletal: She exhibits no edema or deformity.   Neurological: She has normal reflexes.   gcs 3  Unresponsive to painful stimuli     Skin: Skin is warm and dry. She is not diaphoretic.   Nursing note and vitals reviewed.          Significant Labs:   covid -19 negative  Urine drug screen + benzo and opiates  CBC:   Recent Labs   Lab 05/07/20  0911 05/07/20  1411   WBC 10.25  --    HGB 14.2  --    HCT 47.8 49     --      CMP:   Recent Labs   Lab 05/07/20  0911 05/07/20  1230 05/07/20  1424    140 142   K 4.6 3.5 3.3*   CL 98 107 106   CO2 25 24 24   * 153*  153* 183*  183*  183*   BUN 16 19 18   CREATININE 1.2 0.9 0.9   CALCIUM 8.7 8.5* 8.7   PROT 6.8 7.0  --    ALBUMIN 3.5 3.7  --    BILITOT 0.6 0.4  --    ALKPHOS 274* 269*  --    * 129*  --    ALT 60* 67*  --    ANIONGAP 14 9 12   EGFRNONAA 54.7* >60.0 >60.0     Lactic Acid:   Recent Labs   Lab 05/07/20  1010 05/07/20  1425   LACTATE 2.3* 2.3*     Troponin:   Recent Labs   Lab 05/07/20  0911 05/07/20  1230   TROPONINI <0.030 0.031   Urine Studies:   Recent Labs   Lab 05/07/20  0918   COLORU Yellow   APPEARANCEUA Clear   PHUR 6.0   SPECGRAV 1.020   PROTEINUA 1+*   GLUCUA Trace*   KETONESU Negative   BILIRUBINUA Negative   OCCULTUA Negative   NITRITE Negative   UROBILINOGEN 2.0-3.0*   LEUKOCYTESUR Negative   RBCUA 1   WBCUA 3   BACTERIA Negative   SQUAMEPITHEL 4   HYALINECASTS 19*     All pertinent labs within the past 24 hours have been reviewed.    EKG my interpretation, prolonged QT, incomplete right bundle branch block, no significant ST segment changes    Significant Imaging: I have reviewed all pertinent imaging results/findings within the past 24 hours.   Imaging Results          CT Head Without Contrast (Final result)  Result time 05/07/20 13:19:26    Final result by Ethan Mccoy MD (05/07/20 13:19:26)                 Impression:      Markedly abnormal appearing exam  as above and findings of global hypoxic ischemic injury (anoxic brain injury), with diffuse cerebral edema (with effacement of the saw slightly, suprasellar/basilar cisterns and foramen magnum).    RESULT NOTIFICATION: These observations were discussed by the dictating physician, by phone with, and acknowledged by Demetria KIRBY to be communicated to glo Herrera at 13:13 on 5/7/2020.      Electronically signed by: Ethan Mccoy MD  Date:    05/07/2020  Time:    13:19             Narrative:    CLINICAL HISTORY:  ([id number])43 y/o  (1975) F    Confusion/delirium, altered LOC, unexplained;    TECHNIQUE:  (A#[id number], exam time 5/7/2020 13:08)    CT HEAD WITHOUT CONTRAST [id number]    Axial CT of the brain without contrast using soft tissue and bone algorithm. Please note in the acute setting if there is a clinical concern for an acute stroke MRI would be more sensitive/specific for evaluation of ischemia.    CMS MANDATED QUALITY DATA - CT RADIATION - 436    All CT scans at this facility utilize dose modulation, iterative reconstruction, and/or weight based dosing when appropriate to reduce radiation dose to as low as reasonably achievable.    COMPARISON:  None available.    FINDINGS:  Markedly abnormal appearing exam with diffuse loss of the normal gray-white differentiation throughout the cerebral and cerebellar hemispheres.  There is loss of the normal sulcation pattern throughout the cerebral and cerebellar hemispheres with effacement of the suprasellar and basilar cisterns as well as the foramen magnum.  No gross acute intracranial hemorrhage is identified (noting that the tentorium in sulci are bright compared to the adjacent edematous cerebral and cerebellar parenchyma), no midline shift or hydrocephalus is seen.  There are tiny air-fluid levels in both maxillary sinuses.  Scattered areas of mucosal thickening throughout the mid ethmoid air cells and maxillary sinuses, consider correlation for acute  sinusitis.                               X-Ray Chest 1 View (Final result)  Result time 05/07/20 10:05:45    Final result by Davis Ortega MD (05/07/20 10:05:45)                 Narrative:    XR CHEST 1 VIEW    CLINICAL HISTORY:  44 years Female    COMPARISON: May 7, 2020    FINDINGS: Cardiomediastinal silhouette is stable from prior. Tip of  endotracheal tube is located at the level of the ari and should be  retracted for optimal positioning. Enteric tube has been repositioned,  with tip now appropriately located within the stomach. Loop recorder  projects over the left chest. Right perihilar and medial right upper  lobe airspace disease persists. No pleural fluid. No pneumothorax.    IMPRESSION:    1. Enteric tube is now in appropriate position.  2. ET tube tip remains at the level of the ari and should be  retracted.  3. Stable right parahilar and medial right upper lobe airspace disease  concerning for pneumonia.    Electronically Signed by Davis MOTA on 5/7/2020 10:10 AM                             X-Ray Chest AP Portable (Final result)  Result time 05/07/20 09:30:36    Final result by Hugo Herrera MD (05/07/20 09:30:36)                 Narrative:    REASON: Cardiac Arrest    TECHNIQUE: AP radiograph of the chest    COMPARISON: Chest x-ray March 11, 2020.    FINDINGS:    The cardiomediastinal silhouette is within normal limits in size. A  loop recorder device is again noted. Endotracheal tube identified with  tip at the level of the ari. Nasogastric tube is coiled within the  esophagus, and the tip is directed cephalad. Right lung perihilar and  upper lung hazy ill-defined opacification noted. The left lung is  clear. No acute osseous abnormality.    IMPRESSION:    1.  Endotracheal tube identified with tip at the level of the  ari. Recommend retraction of at least 3 cm.  2.  Nasogastric tube is coiled within the esophagus in the tip is  directed cephalad.  3.  Right perihilar and  upper lung hazy ill-defined opacification.  Findings likely reflect an infectious process and/or atelectasis.    Electronically Signed by Hugo Herrera on 5/7/2020 9:33 AM                                Assessment/Plan:   44-year-old female past medical history of hypertension, diabetes major depressive disorder found unresponsive and pulseless at home early this morning s/p Cardiac arrest with ROSC with CT finding showing anoxic brain injury and cerberal edema currently undergoing hypothermia protocols.      Cardiac Arrest:   - last known normal 6am and found unresponsive, pulseless with vomitus  at 715  - cpr started by  and later 20minutes of ACLS with EMS (4 epi, narcan and intubated in field)  - per  patient took only scheduled doses of bedtime medications.    - per chart review patient has prescriptions for Klonopin 2 mg, Flexeril 10 mg, Seroquel 400 mg, Adderall 30 mg. Ambien 10 mg  - Utox positive benzo and opiates  - etiology likely polypharmacy/aspiration leading to arrest  - pulm consulted, recs appreciated  - cardiology consulted, recs appreciated   - broad-spectrum antibiotics, vanc and Zosyn  - hypothermia protocols  - sliding scale insulin and Accu-Cheks to maintain blood glucose levels  - Echo ordered  - CT head showing anoxic brain injury and cerebral edema   - poor prognosis      Goals of care: discussed poor prognosis with     Diet: NPO  CODE: full (discussed with )  PPx: gi pepcid, dvt lovenox    Active Hospital Problems    Diagnosis  POA    *Cardiac arrest [I46.9]  Yes    Cerebral edema [G93.6]  Yes    Acute hypoxemic respiratory failure [J96.01]  Yes    Aspiration into lower respiratory tract [T17.800A]  Yes    Polypharmacy [Z79.899]  Not Applicable    Opiate overdose [T40.601A]  Yes    Benzodiazepine overdose [T42.4X1A]  Yes    Gastroesophageal reflux disease without esophagitis [K21.9]  Yes    Irritable bowel syndrome with diarrhea [K58.0]  Yes     Moderate episode of recurrent major depressive disorder [F33.1]  Yes    Diabetes [E11.9]  Yes    Anxiety [F41.9]  Yes    Mood disorder [F39]  Yes    Insomnia [G47.00]  Yes    Transaminitis [R74.0]  Yes    Hypertension [I10]  Yes      Resolved Hospital Problems   No resolved problems to display.         VTE Risk Mitigation (From admission, onward)         Ordered     enoxaparin injection 40 mg  Every 12 hours      05/07/20 1134     IP VTE HIGH RISK PATIENT  Once      05/07/20 1134     Place sequential compression device  Until discontinued      05/07/20 1134                   Kaitlin Herrera DO  Department of Hospital Medicine   [facility]

## 2020-05-07 NOTE — ASSESSMENT & PLAN NOTE
· Serial blood glucose monitoring.  · Correctional dose sliding scale insulin as needed to maintain moderate glycemic control.

## 2020-05-07 NOTE — CONSULTS
[facility]  Pulmonology   Consult Note    Inpatient consult to Pulmonology  Consult performed by: King Castorena MD  Consult ordered by: Kaitlin Herrera DO  Reason for consult: Cardiac arrest  Assessment/Recommendations: Polysubstance abuse with massive aspiration event and PEA arrest, with apparent profound anoxic brain injury.  -  Supportive care.  -  Broad-spectrum antibiotics out of an abundance of caution.  -  24 hr of targeted temperature management  -  Neuro prognostication at 72 hr post arrest         Subjective     Chief Complaint   Patient presents with    Cardiac Arrest      History of Present Illness:  Ms. Chelsea Solano is a 44-year-old lady with past medical history of bipolar disorder and ADHD was in her usual state of health until this morning when she was discovered by her  to be unconscious, pulseless, and covered in vomit when he woke up.  He initiated CPR after activating EMS, and upon arrival EMS provided ACLS.  After approximately 20 min a pulse was a obtained, and she was transferred to the [facility] Emergency Department.  She was intubated in the field, and there is some difficulty ventilating her upon arrival.  Thus, she was reintubated with resolution of her ventilatory issues.  She has not regained consciousness since.  And pulmonology has been consulted to assist in managing this patient post cardiac arrest.     Past Medical History:   Diagnosis Date    ADHD (attention deficit hyperactivity disorder)     Anxiety     Bipolar 2 disorder     Depression     Diabetes mellitus     Hypertension 3/16/2013    Kidney stones March 2014    Migraine headache     TIA (transient ischemic attack)     UTI (urinary tract infection) March 2014     Past Surgical History:   Procedure Laterality Date    CHOLECYSTECTOMY      HYSTERECTOMY      INSERTION OF IMPLANTABLE LOOP RECORDER      KNEE SURGERY  2003    WRIST SURGERY  2001     Family History    Problem Relation Age of Onset    Hypertension Mother     Schizophrenia Mother     Heart disease Mother     Heart disease Father     Diabetes Father     Cancer Father     Cancer Maternal Grandmother       Social History     Tobacco Use    Smoking status: Former Smoker     Packs/day: 1.00     Years: 20.00     Pack years: 20.00     Last attempt to quit: 3/11/2012     Years since quittin.1    Smokeless tobacco: Never Used   Substance and Sexual Activity    Alcohol use: Yes     Alcohol/week: 0.0 standard drinks    Drug use: Yes     Types: Hydrocodone    Sexual activity: Yes     Partners: Male     Birth control/protection: None      Allergies:  Toradol [ketorolac]; Nsaids (non-steroidal anti-inflammatory drug); Ondansetron; and Stadol [butorphanol tartrate]     Facility-Administered Medications as of 2020   Medication Route Frequency    [COMPLETED] 0.9%  NaCl infusion Intravenous ED 1 Time    [COMPLETED] 0.9%  NaCl infusion Intravenous ED 1 Time    acetaminophen oral solution 650 mg Per NG tube Q6H    busPIRone tablet 30 mg Per NG tube Once    calcium gluconate 1g in normal saline 0.9 % 100mL (ready to mix system) Intravenous PRN    calcium gluconate 1g in normal saline 0.9 % 100mL (ready to mix system) Intravenous PRN    calcium gluconate 2 g in dextrose 5 % 100 mL IVPB Intravenous PRN    calcium gluconate 2 g in dextrose 5 % 100 mL IVPB Intravenous PRN    calcium gluconate 3 g in dextrose 5 % 100 mL IVPB Intravenous PRN    calcium gluconate 3 g in dextrose 5 % 100 mL IVPB Intravenous PRN    chlorhexidine 0.12 % solution 15 mL Mouth/Throat BID    dextrose 50% injection 12.5 g Intravenous PRN    dextrose 50% injection 25 g Intravenous PRN    enoxaparin injection 40 mg Subcutaneous Q12H    famotidine (PF) injection 20 mg Intravenous BID    glucagon (human recombinant) injection 1 mg Intramuscular PRN    glucose chewable tablet 16 g Oral PRN    glucose chewable tablet 24 g Oral PRN     insulin aspart U-100 pen 1-10 Units Subcutaneous QID (AC + HS) PRN    [COMPLETED] ipratropium 0.02 % nebulizer solution 1 mg Nebulization ED 1 Time    [COMPLETED] levalbuterol nebulizer solution 3.75 mg Nebulization ED 1 Time    lorazepam injection 2 mg Intravenous Q15 Min PRN    magnesium sulfate 2g in water 50mL IVPB (premix) Intravenous PRN    magnesium sulfate 2g in water 50mL IVPB (premix) Intravenous PRN    magnesium sulfate 2g in water 50mL IVPB (premix) Intravenous PRN    magnesium sulfate 2g in water 50mL IVPB (premix) Intravenous PRN    magnesium sulfate 2g in water 50mL IVPB (premix) Intravenous PRN    piperacillin-tazobactam 4.5 g in dextrose 5 % 100 mL IVPB (ready to mix system) Intravenous Q8H    potassium chloride 10 mEq in 100 mL IVPB Intravenous PRN    And    potassium chloride 10 mEq in 100 mL IVPB Intravenous PRN    potassium chloride 10 mEq in 100 mL IVPB Intravenous PRN    potassium chloride 10 mEq in 100 mL IVPB Intravenous PRN    potassium chloride 10 mEq in 100 mL IVPB Intravenous PRN    sodium chloride 0.9% flush 10 mL Intravenous PRN    sodium phosphate 15 mmol in dextrose 5 % 250 mL IVPB Intravenous PRN    sodium phosphate 15 mmol in dextrose 5 % 250 mL IVPB Intravenous PRN    sodium phosphate 20.01 mmol in dextrose 5 % 250 mL IVPB Intravenous PRN    sodium phosphate 20.01 mmol in dextrose 5 % 250 mL IVPB Intravenous PRN    sodium phosphate 30 mmol in dextrose 5 % 250 mL IVPB Intravenous PRN    sodium phosphate 30 mmol in dextrose 5 % 250 mL IVPB Intravenous PRN    [START ON 5/8/2020] vancomycin (VANCOCIN) 1,750 mg in dextrose 5 % 500 mL IVPB Intravenous Q12H    vancomycin (VANCOCIN) 2,000 mg in dextrose 5 % 500 mL IVPB Intravenous Once    vancomycin - pharmacy to dose Intravenous pharmacy to manage frequency     Outpatient Medications as of 5/7/2020   Medication    albuterol (PROVENTIL/VENTOLIN HFA) 90 mcg/actuation inhaler    albuterol-ipratropium (DUO-NEB)  "2.5 mg-0.5 mg/3 mL nebulizer solution    clonazePAM (KLONOPIN) 2 MG Tab    cyclobenzaprine (FLEXERIL) 10 MG tablet    escitalopram oxalate (LEXAPRO) 10 MG tablet    lidocaine (LIDODERM) 5 %    loperamide (IMODIUM A-D) 2 mg Tab    melatonin (MELATIN)    methocarbamol (ROBAXIN) 500 MG Tab    metoprolol tartrate (LOPRESSOR) 50 MG tablet    pantoprazole (PROTONIX) 20 MG tablet    pramipexole 1.5 mg Tb24    quetiapine (SEROQUEL) 400 MG tablet    zolpidem (AMBIEN) 10 mg Tab    dextroamphetamine-amphetamine 30 mg Tab      Review of Systems   Unable to perform ROS: Intubated     I have personally reviewed the following: active problem list, medication list, allergies, family history, social history, health maintenance, notes from last encounter, lab results, endoscopy procedure notes, imaging and nursing/provider documentation .    Objective     VS: Temp:  [91.4 °F (33 °C)-97.3 °F (36.3 °C)]   Pulse:  [61-89]   Resp:  [14-34]   BP: ()/(39-83)   SpO2:  [92 %-99 %]    Estimated body mass index is 40.23 kg/m² as calculated from the following:    Height as of this encounter: 5' 4" (1.626 m).    Weight as of this encounter: 106.3 kg (234 lb 5.6 oz).   Ideal body weight: 54.7 kg (120 lb 9.5 oz)  Adjusted ideal body weight: 75.3 kg (166 lb 1.5 oz)   I/O:   Intake/Output Summary (Last 24 hours) at 5/7/2020 1615  Last data filed at 5/7/2020 1115  Gross per 24 hour   Intake 2000 ml   Output 80 ml   Net 1920 ml        Vent: SpO2 96%   Vent Mode: A/C  Oxygen Concentration (%):  [] 40  Resp Rate Total:  [28 br/min] 28 br/min  Vt Set:  [380 mL-500 mL] 380 mL  PEEP/CPAP:  [5 cmH20-8 cmH20] 5 cmH20  Pressure Support:  [0 cmH20] 0 cmH20  Mean Airway Pressure:  [16 [id number] cmH20] 16 cmH20     PE Physical Exam   Constitutional: She appears well-developed and well-nourished. She is intubated. She is obese.   HENT:   Head: Normocephalic.   Right Ear: External ear normal.   Left Ear: External ear normal.   Nose: Nose " normal.   Mouth/Throat: Mallampati Score: unable to assess.   Pupils 3 mm and not reactive to light.  Negative doll's eye reflex  Corneal reflex absent   Neck: No JVD present. No tracheal deviation present. No thyromegaly present.   Cardiovascular: Normal rate, regular rhythm, normal heart sounds and intact distal pulses.   No murmur heard.  Slightly impaired systolic function on focused bedside ultrasonography performed in the emergency department.  I was unable to identify any focal wall motion abnormalities or pericardial fluid.  IVC was plump and not collapsing with inspiration.   Pulmonary/Chest: Normal expansion and symmetric chest wall expansion. She is intubated. She has no wheezes. She has rhonchi. She has no rales.   Abdominal: Soft. Bowel sounds are normal. She exhibits no distension. There is no tenderness.   Genitourinary:   Genitourinary Comments: Turpin catheter   Musculoskeletal: Normal range of motion. She exhibits no edema or deformity.   Lymphadenopathy: No supraclavicular adenopathy is present.     She has no cervical adenopathy.     She has no axillary adenopathy.   Neurological: She is unresponsive. She exhibits abnormal muscle tone. GCS eye subscore is 1. GCS verbal subscore is 1. GCS motor subscore is 1.   Absent gag, corneal, afferent pupillary light, and cough reflexes.  No response to noxious stimuli.  Not initiating spontaneous breaths.   Skin: Skin is warm and dry. No rash noted.   Nursing note and vitals reviewed.       Recent Diagnostic Studies:  Labs I have personally reviewed and interpreted all recent labs / diagnostic studies, and noted the following relevant results:  Lactate:  2.3, 2.3  Procalcitonin:  0.60  CPK:  214  Troponin:  Less than 0.030 habits  COVID-19 PCR:  Negative  Urine drug screen:  Positive for both opiates and benzodiazepine  Urinalysis:  unremarkable  Urine pregnancy test:  Negative  Recent Labs   Lab 05/07/20  0911  05/07/20  1230 05/07/20  1411 05/07/20  1424    WBC 10.25  --   --   --   --    RBC 4.65  --   --   --   --    HGB 14.2  --   --   --   --    HCT 47.8  --   --  49  --      --   --   --   --    *  --   --   --   --    MCH 30.5  --   --   --   --    MCHC 29.7*  --   --   --   --      --  140  --  142   K 4.6  --  3.5  --  3.3*   CL 98  --  107  --  106   CO2 25  --  24  --  24   BUN 16  --  19  --  18   CREATININE 1.2  --  0.9  --  0.9   MG 2.2  --   --   --  1.8   ALT 60*  --  67*  --   --    *  --  129*  --   --    ALKPHOS 274*  --  269*  --   --    BILITOT 0.6  --  0.4  --   --    PROT 6.8  --  7.0  --   --    ALBUMIN 3.5  --  3.7  --   --    PH  --    < >  --  7.375  --    PCO2  --    < >  --  42.5  --    PO2  --    < >  --  79*  --    HCO3  --    < >  --  24.8  --    POCSATURATED  --    < >  --  95  --    BE  --    < >  --  0  --    CPK  --   --  214*  --   --    CPKMB 1.2  --   --   --   --    TROPONINI <0.030  --  0.031  --   --     < > = values in this interval not displayed.      Imaging I have personally reviewed and interpreted all available images and reviewed the associated Radiology reports.  My personal impression of the relevant studies is as follows:  Chest x-ray:  Stable right parahilar and medial right upper lobe airspace disease concerning for pneumonia.    CT head:  Markedly abnormal appearing exam as above and findings of global hypoxic ischemic injury (anoxic brain injury), with diffuse cerebral edema (with effacement of the saw slightly, suprasellar/basilar cisterns and foramen magnum).    EKG:  Possible Left atrial enlargement  Incomplete right bundle branch block  Prolonged QT     Micro I have personally reviewed and interpreted the available culture data.  Relevant results are as follows.  Blood Culture No results found for: LABBLOO, Sputum Culture   Lab Results   Component Value Date    GSRESP Moderate WBC's 05/07/2020    GSRESP >10 epithelial cells per low power field 05/07/2020    GSRESP Few Gram positive  cocci 05/07/2020    GSRESP Few yeast 05/07/2020    GSRESP Few Gram positive rods 05/07/2020    GSRESP Rare Gram negative rods 05/07/2020    and Urine Culture    Lab Results   Component Value Date    LABURIN No significant growth 03/16/2014        Assessment       Active Hospital Problems    Diagnosis    *Cardiac arrest    Cerebral edema    Acute hypoxemic respiratory failure    Aspiration into lower respiratory tract    Polypharmacy    Opiate overdose    Benzodiazepine overdose    Gastroesophageal reflux disease without esophagitis    Irritable bowel syndrome with diarrhea    Moderate episode of recurrent major depressive disorder    Diabetes    Anxiety    Mood disorder    Insomnia    Transaminitis    Hypertension      My Impression:  Polypharmacy with aspiration event leading to hypoxic with respiratory failure prolonged cardiac arrest with subsequent apparent anoxic brain injury and cerebral edema.  Considering the radiographic appearance over brain this early on, I suspect her injury is going to be profound.    Plan     Neuro  · Supportive care.  · 24 hr of targeted temperature management to a temperature of 36° centigrade.  · Neuro prognostication in 72 hr.    Pulmonary  · Continue lung protective ventilation for now.  · Empiric antibiotics out of an abundance of caution.    Cardiac/Vascular  · No compelling evidence of ACS, but will consult Cardiology out of an abundance of caution.  · I do not anticipate invasive workup.  · Continue to trend cardiac biomarkers to be thorough.    Renal/  · No acute issues.    ID  · Empiric broad-spectrum antibiotics out of an abundance of caution.  · Surveillance cultures obtained.  Will deescalate as culture data becomes available.    Hematology  · No indication for blood products.  · Observation for now.    Endocrine  · Serial blood glucose monitoring.  · Correctional dose sliding scale insulin as needed to maintain moderate glycemic control.    GI  · Slight  transaminitis likely a consequence of low-flow state.  · H2 blocker for stress ulcer prophylaxis.       Thank you for your consult. I will follow-up with patient. Please contact us if you have any additional questions.    King Castorena MD  Pulmonary / Critical Care Medicine  [facility]            Critical Care Time: Approximately >35 minutes    The patient is critically ill due to the following conditions that actively pose threat to life and bodily function:  Cardiac arrest requiring CPR / IV medications, Acute coma/unconsciousness not secondary to hypoglycemia    The patient is at high risk of death due to central nervous system failure, circulatory failure, respiratory failure and requiring ongoing treatment including invasive mechanical ventilation, frequent neurologic assessment to prevent further life-threatening deterioration.  Due to a high probability of clinically significant, life threatening deterioration, the patient required my highest level of preparedness to intervene emergently and I personally spent this critical care time directly and personally managing the patient.     Critical care was time spent personally by me on the following activities:    Obtaining a history   Examination of patient.   Reviewing pulse oximetry.   Providing medical care at the patients bedside.   Developing a treatment plan with patient or surrogate and bedside caregivers   Ordering and reviewing laboratory studies, radiographic studies, pulse oximetry.   Ordering and performing treatments and interventions.   Evaluation of patient's response to treatment.   Discussions with consultants while on the unit and immediately available to the patient.   Re-evaluation of the patient's condition.   Documentation in the medical record.    This critical care time did not overlap with that of any other provider or involve time for any procedures.     King Castorena MD  Pulmonary / Critical Care  Medicine  [facility]  05/07/2020  4:16 PM

## 2020-05-07 NOTE — ASSESSMENT & PLAN NOTE
· Slight transaminitis likely a consequence of low-flow state.  · H2 blocker for stress ulcer prophylaxis.

## 2020-05-07 NOTE — NURSING
Received pt from ER to room 2207 via bed. Pt unresponsive, no gag reflex, no response to pain. No respirations noted over ventilator setting. Temp 91.7. Will monitor.    Kiesha Kovacs RN  5/7/2020

## 2020-05-07 NOTE — HPI
Ms. Chelsea Soalno is a 44-year-old lady with past medical history of bipolar disorder and ADHD was in her usual state of health until this morning when she was discovered by her  to be unconscious, pulseless, and covered in vomit when he woke up.  He initiated CPR after activating EMS, and upon arrival EMS provided ACLS.  After approximately 20 min a pulse was a obtained, and she was transferred to the [facility] Emergency Department.  She was intubated in the field, and there is some difficulty ventilating her upon arrival.  Thus, she was reintubated with resolution of her ventilatory issues.  She has not regained consciousness since.  And pulmonology has been consulted to assist in managing this patient post cardiac arrest.

## 2020-05-07 NOTE — NURSING
Spoke with EBONIE Banrett. Update given. GCS remains 3. Pt suitable for donation. States they will follow pt.  Kiesha Kovacs RN  5/7/2020

## 2020-05-07 NOTE — ASSESSMENT & PLAN NOTE
· No compelling evidence of ACS.  · Cardiology input appreciated.  No additional interventions or workup necessary at this point.

## 2020-05-07 NOTE — PROGRESS NOTES
Pharmacokinetic Initial Assessment: IV Vancomycin    Assessment/Plan:    Initiate intravenous vancomycin with loading dose of 2000 mg once followed by a maintenance dose of vancomycin 1750 mg IV every 12 hours  Desired empiric serum trough concentration is 10 to 15 mcg/mL  Draw vancomycin trough level 30 min prior to fourth dose on 5/09 at approximately 02:00   Pharmacy will continue to follow and monitor vancomycin.      Please contact pharmacy at extension [phone] with any questions regarding this assessment.     Thank you for the consult,   Nimco Rincon       Patient brief summary:  Chelsea Solano is a 44 y.o. female initiated on antimicrobial therapy with IV Vancomycin for treatment of suspected Pneumonia     Drug Allergies:   Review of patient's allergies indicates:   Allergen Reactions    Toradol [ketorolac] Hives    Nsaids (non-steroidal anti-inflammatory drug)     Ondansetron Hives    Stadol [butorphanol tartrate] Other (See Comments)     hallucinations       Actual Body Weight:   106.3 kg    Renal Function:   Estimated Creatinine Clearance: 93.8 mL/min (based on SCr of 0.9 mg/dL).,     Dialysis Method (if applicable):  N/A    CBC (last 72 hours):  Recent Labs   Lab Result Units 05/07/20  0911   WBC K/uL 10.25   Hemoglobin g/dL 14.2   Hematocrit % 47.8   Platelets K/uL 282   Gran% % 60.6   Lymph% % 33.8   Mono% % 1.7*   Eosinophil% % 0.8   Basophil% % 0.5   Differential Method  Automated       Metabolic Panel (last 72 hours):  Recent Labs   Lab Result Units 05/07/20  0911 05/07/20  0918 05/07/20  1230   Sodium mmol/L 137  --  140   Potassium mmol/L 4.6  --  3.5   Chloride mmol/L 98  --  107   CO2 mmol/L 25  --  24   Glucose mg/dL 318*  --  153*  153*   Glucose, UA   --  Trace*  --    BUN, Bld mg/dL 16  --  19   Creatinine mg/dL 1.2  --  0.9   Creatinine, Random Ur mg/dL  --  150.0  --    Albumin g/dL 3.5  --  3.7   Total Bilirubin mg/dL 0.6  --  0.4   Alkaline Phosphatase U/L 274*  --  269*   AST U/L 122*   --  129*   ALT U/L 60*  --  67*   Magnesium mg/dL 2.2  --   --        Drug levels (last 3 results):  No results for input(s): VANCOMYCINRA, VANCOMYCINPE, VANCOMYCINTR in the last 72 hours.    Microbiologic Results:  Microbiology Results (last 7 days)       Procedure Component Value Units Date/Time    Blood Culture #2 **CANNOT BE ORDERED STAT** [[id number]] Collected:  05/07/20 1222    Order Status:  Sent Specimen:  Blood from Midline, Cephalic, Right Updated:  05/07/20 1223    Urine culture [[id number]]     Order Status:  No result Specimen:  Urine, Catheterized     Culture, Respiratory with Gram Stain [[id number]]     Order Status:  Sent Specimen:  Respiratory from Sputum     Blood culture [[id number]]     Order Status:  Sent Specimen:  Blood     Blood culture [[id number]]     Order Status:  Sent Specimen:  Blood     Urine culture [[id number]]     Order Status:  No result Specimen:  Urine, Catheterized     Culture, Respiratory with Gram Stain [[id number]]     Order Status:  Sent Specimen:  Respiratory from Sputum     Blood Culture #1 **CANNOT BE ORDERED STAT** [[id number]] Collected:  05/07/20 1010    Order Status:  Sent Specimen:  Blood from Peripheral, Forearm, Right Updated:  05/07/20 1025    Culture, Respiratory with Gram Stain [[id number]] Collected:  05/07/20 0935    Order Status:  Completed Specimen:  Respiratory from Endotracheal Aspirate Updated:  05/07/20 1017     Gram Stain (Respiratory) Moderate WBC's     Gram Stain (Respiratory) >10 epithelial cells per low power field     Gram Stain (Respiratory) Few Gram positive cocci     Gram Stain (Respiratory) Few yeast     Gram Stain (Respiratory) Few Gram positive rods     Gram Stain (Respiratory) Rare Gram negative rods

## 2020-05-07 NOTE — ED PROVIDER NOTES
Encounter Date: 5/7/2020       History     Chief Complaint   Patient presents with    Cardiac Arrest     possible suicide attempt by OD     Patient here with reported cardiac arrest for EMS patient found unresponsive and not breathing by her  this morning approximately 715 at EMS arrival patient was receiving CPR from her  ACLS measures instituted and after approximately 20 min CPR, interosseous medications patient returned to a sinus rhythm arrival emergency department patient heart rate 78 her blood pressure is 86 systolic she has intubated in the field with a 7 0 ETT in is being ventilated with bag-valve-mask there is no evidence of spontaneous respirations were having difficulty ventilating patient through ET tube attempts to reposition tube were unsuccessful therefore arrival I reintubated the patient with a 7.5 ETT IV established arrival        Review of patient's allergies indicates:   Allergen Reactions    Toradol [ketorolac] Hives    Nsaids (non-steroidal anti-inflammatory drug)     Ondansetron Hives    Stadol [butorphanol tartrate] Other (See Comments)     hallucinations     Past Medical History:   Diagnosis Date    ADHD (attention deficit hyperactivity disorder)     Anxiety     Bipolar 2 disorder     Depression     Diabetes mellitus     Hypertension 3/16/2013    Kidney stones March 2014    Migraine headache     TIA (transient ischemic attack)     UTI (urinary tract infection) March 2014     Past Surgical History:   Procedure Laterality Date    CHOLECYSTECTOMY      HYSTERECTOMY      INSERTION OF IMPLANTABLE LOOP RECORDER      KNEE SURGERY  2003    WRIST SURGERY  2001     Family History   Problem Relation Age of Onset    Hypertension Mother     Schizophrenia Mother     Heart disease Mother     Heart disease Father     Diabetes Father     Cancer Father     Cancer Maternal Grandmother      Social History     Tobacco Use    Smoking status: Former Smoker     Packs/day:  1.00     Years: 20.00     Pack years: 20.00     Last attempt to quit: 3/11/2012     Years since quittin.1    Smokeless tobacco: Never Used   Substance Use Topics    Alcohol use: Yes     Alcohol/week: 0.0 standard drinks    Drug use: Yes     Types: Hydrocodone     Review of Systems   Unable to perform ROS: Intubated       Physical Exam     Initial Vitals [20 0909]   BP Pulse Resp Temp SpO2   (!) 69/39 75 14 -- (!) 92 %      MAP       --         Physical Exam    Constitutional: She is Obese . She appears distressed. She is intubated.   HENT:   Head: Normocephalic and atraumatic.   Right Ear: External ear normal.   Left Ear: External ear normal.   Pooled green secretions in posterior pharynx   Eyes:   Pupils fixed mid   Neck: Normal range of motion. Neck supple.   Cardiovascular: Normal rate, regular rhythm, normal heart sounds and intact distal pulses.   Pulmonary/Chest: Apnea noted. She is intubated. She has decreased breath sounds. She has rhonchi.   Abdominal: Soft. She exhibits no distension.   Genitourinary: There is no lesion on the right labia. There is no lesion on the left labia.   Musculoskeletal: She exhibits no edema or tenderness.   Neurological:   Unresponsive intubated   Skin: Skin is warm and dry. Capillary refill takes 2 to 3 seconds. There is pallor.         ED Course   Intubation  Date/Time: 2020 11:03 AM  Location procedure was performed: [facility] EMERGENCY DEPARTMENT  Performed by: Truong Szymanski MD  Authorized by: Truong Szymanski MD   Pre-operative diagnosis: card arrest  Consent Done: Emergent Situation  Indications: respiratory failure  Intubation method: direct  Patient status: unconscious  Preoxygenation: bag valve mask  Laryngoscope size: Mac 4  Tube size: 7.5 mm  Tube type: cuffed  Number of attempts: 1  Cricoid pressure: no  Cords visualized: yes  Post-procedure assessment: chest rise and ETCO2 monitor  Breath sounds: diminished,  wheezing and absent over the  epigastrium  Cuff inflated: yes  ETT to lip: 23 cm  Tube secured with: ETT barfield  Chest x-ray interpreted by radiologist.  Chest x-ray findings: endotracheal tube too low  Tube repositioned: tube repositioned successfully  Patient tolerance: Patient tolerated the procedure well with no immediate complications        Labs Reviewed   CBC W/ AUTO DIFFERENTIAL - Abnormal; Notable for the following components:       Result Value    Mean Corpuscular Volume 103 (*)     Mean Corpuscular Hemoglobin Conc 29.7 (*)     Immature Granulocytes 2.6 (*)     Immature Grans (Abs) 0.27 (*)     Mono # 0.2 (*)     Mono% 1.7 (*)     All other components within normal limits   COMPREHENSIVE METABOLIC PANEL - Abnormal; Notable for the following components:    Glucose 318 (*)     Alkaline Phosphatase 274 (*)      (*)     ALT 60 (*)     eGFR if non  54.7 (*)     All other components within normal limits   URINALYSIS - Abnormal; Notable for the following components:    Protein, UA 1+ (*)     Glucose, UA Trace (*)     Urobilinogen, UA 2.0-3.0 (*)     All other components within normal limits    Narrative:     In and Out Cath as needed it patient unable to void  Specimen Source->Urine   SALICYLATE LEVEL - Abnormal; Notable for the following components:    Salicylate Lvl <4.0 (*)     All other components within normal limits   URINALYSIS MICROSCOPIC - Abnormal; Notable for the following components:    Hyaline Casts, UA 19 (*)     All other components within normal limits    Narrative:     In and Out Cath as needed it patient unable to void  Specimen Source->Urine   ISTAT PROCEDURE - Abnormal; Notable for the following components:    POC PH 7.194 (*)     POC PCO2 68.9 (*)     POC PO2 424 (*)     POC TCO2 29 (*)     All other components within normal limits   POCT GLUCOSE - Abnormal; Notable for the following components:    POC Glucose 291 (*)     All other components within normal limits   CULTURE, BLOOD   CULTURE, BLOOD    CULTURE, RESPIRATORY   DRUG SCREEN PANEL, URINE EMERGENCY    Narrative:     In and Out Cath as needed it patient unable to void  Specimen Source->Urine   ACETAMINOPHEN LEVEL   ALCOHOL,MEDICAL (ETHANOL)   TROPONIN I   CK-MB   MAGNESIUM   SARS-COV-2 RNA AMPLIFICATION, QUAL   LACTIC ACID, PLASMA   POCT URINE PREGNANCY   POCT GLUCOSE MONITORING CONTINUOUS        ECG Results          EKG 12-lead (In process)  Result time 05/07/20 09:31:46    In process by Interface, Lab In [facility] (05/07/20 09:31:46)                 Narrative:    Test Reason : I46.9,    Vent. Rate : 079 BPM     Atrial Rate : 079 BPM     P-R Int : 178 ms          QRS Dur : 094 ms      QT Int : 438 ms       P-R-T Axes : 066 063 046 degrees     QTc Int : 502 ms    Normal sinus rhythm  Possible Left atrial enlargement  Incomplete right bundle branch block  Prolonged QT  Abnormal ECG  When compared with ECG of 22-MAR-2014 08:38,  Incomplete right bundle branch block is now Present    Referred By: [email or web address]   SELF           Confirmed By:                   In process by Interface, Lab In [facility] (05/07/20 09:29:44)                 Narrative:    Test Reason : I46.9,    Vent. Rate : 079 BPM     Atrial Rate : 079 BPM     P-R Int : 178 ms          QRS Dur : 094 ms      QT Int : 438 ms       P-R-T Axes : 066 063 046 degrees     QTc Int : 502 ms    Normal sinus rhythm  Possible Left atrial enlargement  Incomplete right bundle branch block  Prolonged QT  Abnormal ECG  When compared with ECG of 22-MAR-2014 08:38,  Incomplete right bundle branch block is now Present    Referred By: [email or web address]   SELF           Confirmed By:                             Imaging Results          X-Ray Chest 1 View (In process)                X-Ray Chest AP Portable (Final result)  Result time 05/07/20 09:30:36    Final result by Hugo Herrera MD (05/07/20 09:30:36)                 Narrative:    REASON: Cardiac Arrest    TECHNIQUE: AP radiograph of the chest    COMPARISON: Chest x-ray  March 11, 2020.    FINDINGS:    The cardiomediastinal silhouette is within normal limits in size. A  loop recorder device is again noted. Endotracheal tube identified with  tip at the level of the ari. Nasogastric tube is coiled within the  esophagus, and the tip is directed cephalad. Right lung perihilar and  upper lung hazy ill-defined opacification noted. The left lung is  clear. No acute osseous abnormality.    IMPRESSION:    1.  Endotracheal tube identified with tip at the level of the  ari. Recommend retraction of at least 3 cm.  2.  Nasogastric tube is coiled within the esophagus in the tip is  directed cephalad.  3.  Right perihilar and upper lung hazy ill-defined opacification.  Findings likely reflect an infectious process and/or atelectasis.    Electronically Signed by Hugo Herrera on 5/7/2020 9:33 AM                               Medical Decision Making:   ED Management:  Discussed patient's history with her  he states that patient did take a sleeping pill last night prior to bedtime states that they spoke approximately 6:00 a.m. and when he awoke at 7:15 a.m. she was not breathing he began CPR he states that she did make suicidal threats approximately a week ago he is uncertain as whether not she overdose this morning he states that he has a loop recorder in place because she was having difficulty with tachycardia he denies any recent changes in her medications patient's blood pressure has steadily improved she was given dose of Zosyn for apparent infiltrate her right upper lobe I have discussed patient's care with Dr. Herrera patient's rectal temp is 97.3 will obtain CT scan in route to attentive care unit              Attending Attestation:         Attending Critical Care:   Critical Care Times:   Direct Patient Care (initial evaluation, reassessments, and time considering the case)................................................................25 minutes.   Additional History from  reviewing old medical records or taking additional history from the family, EMS, PCP, etc.......................10 minutes.   Ordering, Reviewing, and Interpreting Diagnostic Studies...............................................................................................................15 minutes.   Documentation..................................................................................................................................................................................12 minutes.   Consultation with other Physicians. .................................................................................................................................................15 minutes.   ==============================================================  · Total Critical Care Time - exclusive of procedural time: 77 minutes.  ==============================================================  Critical care was necessary to treat or prevent imminent or life-threatening deterioration of the following conditions: cardiac arrest.                             Clinical Impression:       ICD-10-CM ICD-9-CM   1. Cardiac arrest I46.9 427.5   2. Chest pain R07.9 786.50   3. Cardiac arrest I46.9 427.5                                Truong Szymanski MD  05/07/20 1222       Truong Szymanski MD  06/16/20 3144

## 2020-05-07 NOTE — HPI
44-year-old female with past medical history of diabetes, hypertension and multiple psychiatric diagnosis including ADHD, bipolar, major depressive disorder brought in by EMS following cardiac arrest in field.  History obtained from patient's  and ED physician.  Per  patient was in usual state of health and last known normal around 530-600 a.m. this morning.  They returned to sleep and  woke up at around 7:15 a.m. and noticed that patient was unresponsive, pulseless and covered in vomit.  He began CPR and notified EMS. On EMS arrival, ACLS continued and patient received epi x 4 and Narcan with ROSC achieved after about 20 min..  Patient was intubated in the field.    In ED,  re-intubated 2/2 issues ventilating patient. CXR right upper lobe infection vs atelectasis, CT head obtained showing global hypoxic ischemic injury and diffuse cerebral edema.  Utox + opiates and benzos.   Per , patient took her prescribed nighttime medications.  He initially told ER physician that patient had voiced suicidal ideation about 1 week ago but when discussed again he denied.

## 2020-05-07 NOTE — Clinical Note
Catheter is repositioned to the ostium   left main. Angiography performed of the left coronary arteries in multiple views. Angiography performed via power injection with 8 mL contrast at 4 mL/s. Used JL4 Catheter

## 2020-05-08 PROBLEM — E23.2 DIABETES INSIPIDUS: Status: ACTIVE | Noted: 2020-05-08

## 2020-05-08 LAB
ALBUMIN SERPL BCP-MCNC: 3.3 G/DL (ref 3.5–5.2)
ALLENS TEST: ABNORMAL
ALLENS TEST: NORMAL
ALP SERPL-CCNC: 198 U/L (ref 55–135)
ALT SERPL W/O P-5'-P-CCNC: 50 U/L (ref 10–44)
AMPHET+METHAMPHET UR QL: NEGATIVE
ANION GAP SERPL CALC-SCNC: 10 MMOL/L (ref 8–16)
ANION GAP SERPL CALC-SCNC: 10 MMOL/L (ref 8–16)
ANION GAP SERPL CALC-SCNC: 7 MMOL/L (ref 8–16)
ANION GAP SERPL CALC-SCNC: 8 MMOL/L (ref 8–16)
ANION GAP SERPL CALC-SCNC: 9 MMOL/L (ref 8–16)
APTT PPP: 30.1 SEC (ref 23.6–33.3)
AST SERPL-CCNC: 44 U/L (ref 10–40)
BARBITURATES UR QL SCN>200 NG/ML: NEGATIVE
BASOPHILS # BLD AUTO: 0.02 K/UL (ref 0–0.2)
BASOPHILS NFR BLD: 0.2 % (ref 0–1.9)
BENZODIAZ UR QL SCN>200 NG/ML: NEGATIVE
BILIRUB SERPL-MCNC: 1.1 MG/DL (ref 0.1–1)
BUN SERPL-MCNC: 13 MG/DL (ref 6–20)
BUN SERPL-MCNC: 14 MG/DL (ref 6–20)
BUN SERPL-MCNC: 15 MG/DL (ref 6–20)
BUN SERPL-MCNC: 15 MG/DL (ref 6–20)
BZE UR QL SCN: NEGATIVE
CALCIUM SERPL-MCNC: 8.1 MG/DL (ref 8.7–10.5)
CALCIUM SERPL-MCNC: 8.4 MG/DL (ref 8.7–10.5)
CALCIUM SERPL-MCNC: 8.6 MG/DL (ref 8.7–10.5)
CALCIUM SERPL-MCNC: 8.6 MG/DL (ref 8.7–10.5)
CALCIUM SERPL-MCNC: 8.7 MG/DL (ref 8.7–10.5)
CALCIUM SERPL-MCNC: 8.9 MG/DL (ref 8.7–10.5)
CALCIUM SERPL-MCNC: 8.9 MG/DL (ref 8.7–10.5)
CANNABINOIDS UR QL SCN: NEGATIVE
CHLORIDE SERPL-SCNC: 114 MMOL/L (ref 95–110)
CHLORIDE SERPL-SCNC: 114 MMOL/L (ref 95–110)
CHLORIDE SERPL-SCNC: 117 MMOL/L (ref 95–110)
CHLORIDE SERPL-SCNC: 119 MMOL/L (ref 95–110)
CO2 SERPL-SCNC: 23 MMOL/L (ref 23–29)
CO2 SERPL-SCNC: 23 MMOL/L (ref 23–29)
CO2 SERPL-SCNC: 24 MMOL/L (ref 23–29)
CO2 SERPL-SCNC: 25 MMOL/L (ref 23–29)
CREAT SERPL-MCNC: 0.9 MG/DL (ref 0.5–1.4)
CREAT SERPL-MCNC: 1 MG/DL (ref 0.5–1.4)
CREAT SERPL-MCNC: 1.1 MG/DL (ref 0.5–1.4)
CREAT UR-MCNC: 19 MG/DL (ref 15–325)
DELSYS: ABNORMAL
DELSYS: NORMAL
DIFFERENTIAL METHOD: ABNORMAL
EOSINOPHIL # BLD AUTO: 0 K/UL (ref 0–0.5)
EOSINOPHIL NFR BLD: 0.2 % (ref 0–8)
ERYTHROCYTE [DISTWIDTH] IN BLOOD BY AUTOMATED COUNT: 14.6 % (ref 11.5–14.5)
ERYTHROCYTE [SEDIMENTATION RATE] IN BLOOD BY WESTERGREN METHOD: 26 MM/H
ERYTHROCYTE [SEDIMENTATION RATE] IN BLOOD BY WESTERGREN METHOD: 28 MM/H
EST. GFR  (AFRICAN AMERICAN): >60 ML/MIN/1.73 M^2
EST. GFR  (NON AFRICAN AMERICAN): >60 ML/MIN/1.73 M^2
FIO2: 40
FIO2: 50
GLUCOSE SERPL-MCNC: 126 MG/DL (ref 70–110)
GLUCOSE SERPL-MCNC: 126 MG/DL (ref 70–110)
GLUCOSE SERPL-MCNC: 128 MG/DL (ref 70–110)
GLUCOSE SERPL-MCNC: 163 MG/DL (ref 70–110)
GLUCOSE SERPL-MCNC: 163 MG/DL (ref 70–110)
GLUCOSE SERPL-MCNC: 181 MG/DL (ref 70–110)
GLUCOSE SERPL-MCNC: 181 MG/DL (ref 70–110)
GLUCOSE SERPL-MCNC: 278 MG/DL (ref 70–110)
GLUCOSE SERPL-MCNC: 278 MG/DL (ref 70–110)
HCO3 UR-SCNC: 23.8 MMOL/L (ref 24–28)
HCO3 UR-SCNC: 24.7 MMOL/L (ref 24–28)
HCT VFR BLD AUTO: 46.6 % (ref 37–48.5)
HGB BLD-MCNC: 15.1 G/DL (ref 12–16)
IMM GRANULOCYTES # BLD AUTO: 0.03 K/UL (ref 0–0.04)
IMM GRANULOCYTES NFR BLD AUTO: 0.3 % (ref 0–0.5)
INR PPP: 1.1
LYMPHOCYTES # BLD AUTO: 1.6 K/UL (ref 1–4.8)
LYMPHOCYTES NFR BLD: 15 % (ref 18–48)
MAGNESIUM SERPL-MCNC: 2 MG/DL (ref 1.6–2.6)
MAGNESIUM SERPL-MCNC: 2.3 MG/DL (ref 1.6–2.6)
MCH RBC QN AUTO: 30.3 PG (ref 27–31)
MCHC RBC AUTO-ENTMCNC: 32.4 G/DL (ref 32–36)
MCV RBC AUTO: 94 FL (ref 82–98)
MIN VOL: 10.4
MIN VOL: 11
MODE: ABNORMAL
MODE: NORMAL
MONOCYTES # BLD AUTO: 0.4 K/UL (ref 0.3–1)
MONOCYTES NFR BLD: 3.5 % (ref 4–15)
NEUTROPHILS # BLD AUTO: 8.7 K/UL (ref 1.8–7.7)
NEUTROPHILS NFR BLD: 80.8 % (ref 38–73)
NRBC BLD-RTO: 0 /100 WBC
OPIATES UR QL SCN: NEGATIVE
PCO2 BLDA: 35.2 MMHG (ref 35–45)
PCO2 BLDA: 43.3 MMHG (ref 35–45)
PCP UR QL SCN>25 NG/ML: NEGATIVE
PEEP: 5
PEEP: 8
PH SMN: 7.36 [PH] (ref 7.35–7.45)
PH SMN: 7.44 [PH] (ref 7.35–7.45)
PHOSPHATE SERPL-MCNC: 2.1 MG/DL (ref 2.7–4.5)
PHOSPHATE SERPL-MCNC: 3.2 MG/DL (ref 2.7–4.5)
PHOSPHATE SERPL-MCNC: 3.2 MG/DL (ref 2.7–4.5)
PHOSPHATE SERPL-MCNC: 3.6 MG/DL (ref 2.7–4.5)
PHOSPHATE SERPL-MCNC: 4 MG/DL (ref 2.7–4.5)
PHOSPHATE SERPL-MCNC: 4 MG/DL (ref 2.7–4.5)
PLATELET # BLD AUTO: 282 K/UL (ref 150–350)
PMV BLD AUTO: 9.6 FL (ref 9.2–12.9)
PO2 BLDA: 57 MMHG (ref 80–100)
PO2 BLDA: 80 MMHG (ref 80–100)
POC BE: -1 MMOL/L
POC BE: 0 MMOL/L
POC SATURATED O2: 91 % (ref 95–100)
POC SATURATED O2: 95 % (ref 95–100)
POC TCO2: 25 MMOL/L (ref 23–27)
POC TCO2: 26 MMOL/L (ref 23–27)
POTASSIUM SERPL-SCNC: 3.2 MMOL/L (ref 3.5–5.1)
POTASSIUM SERPL-SCNC: 3.2 MMOL/L (ref 3.5–5.1)
POTASSIUM SERPL-SCNC: 3.3 MMOL/L (ref 3.5–5.1)
POTASSIUM SERPL-SCNC: 3.3 MMOL/L (ref 3.5–5.1)
POTASSIUM SERPL-SCNC: 3.4 MMOL/L (ref 3.5–5.1)
POTASSIUM SERPL-SCNC: 3.4 MMOL/L (ref 3.5–5.1)
POTASSIUM SERPL-SCNC: 3.5 MMOL/L (ref 3.5–5.1)
PROT SERPL-MCNC: 6.2 G/DL (ref 6–8.4)
PROTHROMBIN TIME: 13.9 SEC (ref 10.6–14.8)
RBC # BLD AUTO: 4.98 M/UL (ref 4–5.4)
SAMPLE: ABNORMAL
SAMPLE: NORMAL
SITE: ABNORMAL
SITE: NORMAL
SODIUM SERPL-SCNC: 147 MMOL/L (ref 136–145)
SODIUM SERPL-SCNC: 148 MMOL/L (ref 136–145)
SODIUM SERPL-SCNC: 150 MMOL/L (ref 136–145)
SODIUM SERPL-SCNC: 150 MMOL/L (ref 136–145)
SODIUM SERPL-SCNC: 153 MMOL/L (ref 136–145)
SP02: 100
SP02: 100
TOXICOLOGY INFORMATION: NORMAL
TROPONIN I SERPL DL<=0.01 NG/ML-MCNC: <0.03 NG/ML
TROPONIN I SERPL DL<=0.01 NG/ML-MCNC: <0.03 NG/ML
VT: 380
VT: 380
WBC # BLD AUTO: 10.69 K/UL (ref 3.9–12.7)

## 2020-05-08 PROCEDURE — 94761 N-INVAS EAR/PLS OXIMETRY MLT: CPT

## 2020-05-08 PROCEDURE — 37799 UNLISTED PX VASCULAR SURGERY: CPT

## 2020-05-08 PROCEDURE — 25000003 PHARM REV CODE 250: Performed by: STUDENT IN AN ORGANIZED HEALTH CARE EDUCATION/TRAINING PROGRAM

## 2020-05-08 PROCEDURE — 80048 BASIC METABOLIC PNL TOTAL CA: CPT | Mod: 91

## 2020-05-08 PROCEDURE — 25000003 PHARM REV CODE 250: Performed by: INTERNAL MEDICINE

## 2020-05-08 PROCEDURE — 84484 ASSAY OF TROPONIN QUANT: CPT

## 2020-05-08 PROCEDURE — 63600175 PHARM REV CODE 636 W HCPCS: Mod: JG | Performed by: INTERNAL MEDICINE

## 2020-05-08 PROCEDURE — 27000221 HC OXYGEN, UP TO 24 HOURS

## 2020-05-08 PROCEDURE — 84484 ASSAY OF TROPONIN QUANT: CPT | Mod: 91

## 2020-05-08 PROCEDURE — 94640 AIRWAY INHALATION TREATMENT: CPT

## 2020-05-08 PROCEDURE — 99900035 HC TECH TIME PER 15 MIN (STAT)

## 2020-05-08 PROCEDURE — 83735 ASSAY OF MAGNESIUM: CPT | Mod: 91

## 2020-05-08 PROCEDURE — 84100 ASSAY OF PHOSPHORUS: CPT | Mod: 91

## 2020-05-08 PROCEDURE — 63600175 PHARM REV CODE 636 W HCPCS: Performed by: STUDENT IN AN ORGANIZED HEALTH CARE EDUCATION/TRAINING PROGRAM

## 2020-05-08 PROCEDURE — 85610 PROTHROMBIN TIME: CPT

## 2020-05-08 PROCEDURE — 85025 COMPLETE CBC W/AUTO DIFF WBC: CPT

## 2020-05-08 PROCEDURE — 20000000 HC ICU ROOM

## 2020-05-08 PROCEDURE — 94003 VENT MGMT INPAT SUBQ DAY: CPT

## 2020-05-08 PROCEDURE — S0028 INJECTION, FAMOTIDINE, 20 MG: HCPCS | Performed by: INTERNAL MEDICINE

## 2020-05-08 PROCEDURE — 82803 BLOOD GASES ANY COMBINATION: CPT

## 2020-05-08 PROCEDURE — 25000003 PHARM REV CODE 250

## 2020-05-08 PROCEDURE — 85730 THROMBOPLASTIN TIME PARTIAL: CPT

## 2020-05-08 PROCEDURE — 25000242 PHARM REV CODE 250 ALT 637 W/ HCPCS: Performed by: INTERNAL MEDICINE

## 2020-05-08 PROCEDURE — 80307 DRUG TEST PRSMV CHEM ANLYZR: CPT

## 2020-05-08 PROCEDURE — 99291 PR CRITICAL CARE, E/M 30-74 MINUTES: ICD-10-PCS | Mod: ,,, | Performed by: INTERNAL MEDICINE

## 2020-05-08 PROCEDURE — 99900026 HC AIRWAY MAINTENANCE (STAT)

## 2020-05-08 PROCEDURE — 80053 COMPREHEN METABOLIC PANEL: CPT

## 2020-05-08 PROCEDURE — 99291 CRITICAL CARE FIRST HOUR: CPT | Mod: ,,, | Performed by: INTERNAL MEDICINE

## 2020-05-08 RX ORDER — IPRATROPIUM BROMIDE 0.5 MG/2.5ML
0.5 SOLUTION RESPIRATORY (INHALATION) EVERY 6 HOURS
Status: DISCONTINUED | OUTPATIENT
Start: 2020-05-08 | End: 2020-05-09 | Stop reason: HOSPADM

## 2020-05-08 RX ORDER — SODIUM CHLORIDE, SODIUM LACTATE, POTASSIUM CHLORIDE, CALCIUM CHLORIDE 600; 310; 30; 20 MG/100ML; MG/100ML; MG/100ML; MG/100ML
INJECTION, SOLUTION INTRAVENOUS CONTINUOUS
Status: DISCONTINUED | OUTPATIENT
Start: 2020-05-08 | End: 2020-05-08

## 2020-05-08 RX ORDER — NOREPINEPHRINE BITARTRATE 1 MG/ML
INJECTION, SOLUTION INTRAVENOUS
Status: COMPLETED
Start: 2020-05-08 | End: 2020-05-08

## 2020-05-08 RX ORDER — LEVALBUTEROL 1.25 MG/.5ML
1.25 SOLUTION, CONCENTRATE RESPIRATORY (INHALATION) EVERY 6 HOURS
Status: DISCONTINUED | OUTPATIENT
Start: 2020-05-08 | End: 2020-05-09 | Stop reason: HOSPADM

## 2020-05-08 RX ORDER — DEXTROSE MONOHYDRATE 50 MG/ML
INJECTION, SOLUTION INTRAVENOUS CONTINUOUS
Status: DISCONTINUED | OUTPATIENT
Start: 2020-05-08 | End: 2020-05-09 | Stop reason: HOSPADM

## 2020-05-08 RX ADMIN — FAMOTIDINE 20 MG: 10 INJECTION INTRAVENOUS at 09:05

## 2020-05-08 RX ADMIN — DEXTROSE 0.03 MCG/KG/MIN: 5 SOLUTION INTRAVENOUS at 05:05

## 2020-05-08 RX ADMIN — SODIUM PHOSPHATE, MONOBASIC, MONOHYDRATE AND SODIUM PHOSPHATE, DIBASIC, ANHYDROUS 20.01 MMOL: 276; 142 INJECTION, SOLUTION INTRAVENOUS at 06:05

## 2020-05-08 RX ADMIN — HYDROCORTISONE SODIUM SUCCINATE 100 MG: 100 INJECTION, POWDER, FOR SOLUTION INTRAMUSCULAR; INTRAVENOUS at 12:05

## 2020-05-08 RX ADMIN — DEXTROSE: 5 SOLUTION INTRAVENOUS at 12:05

## 2020-05-08 RX ADMIN — CHLORHEXIDINE GLUCONATE 15 ML: 1.2 RINSE ORAL at 08:05

## 2020-05-08 RX ADMIN — ENOXAPARIN SODIUM 40 MG: 100 INJECTION SUBCUTANEOUS at 08:05

## 2020-05-08 RX ADMIN — PIPERACILLIN AND TAZOBACTAM 4.5 G: 4; .5 INJECTION, POWDER, FOR SOLUTION INTRAVENOUS at 12:05

## 2020-05-08 RX ADMIN — LEVALBUTEROL HYDROCHLORIDE 1.25 MG: 1.25 SOLUTION, CONCENTRATE RESPIRATORY (INHALATION) at 06:05

## 2020-05-08 RX ADMIN — FAMOTIDINE 20 MG: 10 INJECTION INTRAVENOUS at 08:05

## 2020-05-08 RX ADMIN — VANCOMYCIN HYDROCHLORIDE 1750 MG: 1 INJECTION, POWDER, LYOPHILIZED, FOR SOLUTION INTRAVENOUS at 02:05

## 2020-05-08 RX ADMIN — NOREPINEPHRINE BITARTRATE 8000 MCG: 1 INJECTION INTRAVENOUS at 05:05

## 2020-05-08 RX ADMIN — MUPIROCIN: 20 OINTMENT TOPICAL at 08:05

## 2020-05-08 RX ADMIN — LEVALBUTEROL HYDROCHLORIDE 1.25 MG: 1.25 SOLUTION, CONCENTRATE RESPIRATORY (INHALATION) at 11:05

## 2020-05-08 RX ADMIN — SODIUM CHLORIDE 400 ML: 0.45 INJECTION, SOLUTION INTRAVENOUS at 12:05

## 2020-05-08 RX ADMIN — IPRATROPIUM BROMIDE 0.5 MG: 0.5 SOLUTION RESPIRATORY (INHALATION) at 11:05

## 2020-05-08 RX ADMIN — SODIUM CHLORIDE 400 ML: 0.45 INJECTION, SOLUTION INTRAVENOUS at 04:05

## 2020-05-08 RX ADMIN — DESMOPRESSIN ACETATE: 4 SOLUTION INTRAVENOUS at 05:05

## 2020-05-08 RX ADMIN — HYDROCORTISONE SODIUM SUCCINATE 100 MG: 100 INJECTION, POWDER, FOR SOLUTION INTRAMUSCULAR; INTRAVENOUS at 09:05

## 2020-05-08 RX ADMIN — ENOXAPARIN SODIUM 40 MG: 100 INJECTION SUBCUTANEOUS at 09:05

## 2020-05-08 RX ADMIN — SODIUM CHLORIDE 420 ML: 0.45 INJECTION, SOLUTION INTRAVENOUS at 05:05

## 2020-05-08 RX ADMIN — IPRATROPIUM BROMIDE 0.5 MG: 0.5 SOLUTION RESPIRATORY (INHALATION) at 06:05

## 2020-05-08 RX ADMIN — MUPIROCIN: 20 OINTMENT TOPICAL at 09:05

## 2020-05-08 RX ADMIN — SODIUM CHLORIDE, SODIUM LACTATE, POTASSIUM CHLORIDE, AND CALCIUM CHLORIDE: .6; .31; .03; .02 INJECTION, SOLUTION INTRAVENOUS at 09:05

## 2020-05-08 RX ADMIN — INSULIN ASPART 6 UNITS: 100 INJECTION, SOLUTION INTRAVENOUS; SUBCUTANEOUS at 05:05

## 2020-05-08 RX ADMIN — PIPERACILLIN AND TAZOBACTAM 4.5 G: 4; .5 INJECTION, POWDER, FOR SOLUTION INTRAVENOUS at 07:05

## 2020-05-08 RX ADMIN — PIPERACILLIN AND TAZOBACTAM 4.5 G: 4; .5 INJECTION, POWDER, FOR SOLUTION INTRAVENOUS at 05:05

## 2020-05-08 NOTE — CONSULTS
[facility]  Adult Nutrition   Consult Note (Initial Assessment)     SUMMARY     Recommendations  Recommendation/Intervention:   1. RD to monitor NPO status, vent/intubation, and plans to feed.   2. If care is to continue, recommend initiating enteral nutrition within next 24-48 hours if medically able.   · Consider initiation of Vital HP trickle feeds of 10 ml/hr and advancing by 10 ml/hr to goal rate of 55 ml/hr. Recommend consulting RD for Tube Feeding management if patient/family plans are to continue care and if initiation of EN aligns with care plans.   3. Recommend continued monitoring and correction of blood glucose and electrolytes.     Goals:   1. Nutrition support to be initiated within next 24 - 48 hours if plans are to continue supportive care or as per patient/family's wishes.   2. Blood glucose and electrolytes to trend towards normal limits/ target ranges.     Nutrition Goal Status: new    Dietitian Rounds Brief  Patient assessed 2' consult. Patient in ICU. Per progress notes polysubstance abuse with massive aspiration event and PEA arrest, with apparent profound anoxic brain injury. No plans to initiate enteral feeding yet. RN speaking with patient's family at time of rounds x 2 attempts to see. Patient is intubated but no sedation. IVF providing some nutrition but inadequate to meet protein and calorie goals. D5 @ 100 ml/hr (120 gm dextrose = 408 kcal/day).     Reason for Assessment  Reason For Assessment: consult  Diagnosis: cardiac disease  Relevant Medical History: Cardiac arrest; Cerebral edema; Acute hypoxemic respiratory failure;  Aspiration into lower respiratory tract; Polypharmacy; Opiate overdose; Benzodiazepine overdose; Hypertension; Diabetes; Mood disorder; Anxiety; Gastroesophageal reflux disease without esophagitis; Irritable bowel syndrome with diarrhea; Moderate episode of recurrent major depressive disorder  Interdisciplinary Rounds: (no rounds today )    Nutrition  "Risk Screen  Nutrition Risk Screen: other (see comments)(intubated, NPO )             Nutrition/Diet History  Food Allergies: NKFA  Factors Affecting Nutritional Intake: NPO, on mechanical ventilation    Anthropometrics  Temp: 96.8 °F (36 °C)  Height Method: Estimated  Height: 5' 4" (162.6 cm)  Height (inches): 64 in  Weight Method: Bed Scale  Weight: 106.3 kg (234 lb 5.6 oz)  Weight (lb): 234.35 lb  Ideal Body Weight (IBW), Female: 120 lb  % Ideal Body Weight, Female (lb): 195.29 %  BMI (Calculated): 40.2  BMI Grade: greater than 40 - morbid obesity       Weight History:  Wt Readings from Last 10 Encounters:   05/07/20 106.3 kg (234 lb 5.6 oz)   05/07/20 106.3 kg (234 lb 5.6 oz)   03/11/20 106.1 kg (234 lb)   02/21/20 106.2 kg (234 lb 3.2 oz)   06/25/17 80 kg (176 lb 5.9 oz)   04/04/14 106.1 kg (234 lb)   03/20/14 92.1 kg (203 lb)   11/06/13 99.8 kg (220 lb)   10/01/13 99.8 kg (220 lb)   09/28/13 99.8 kg (220 lb)       Lab/Procedures/Meds: Pertinent Labs Reviewed  Clinical Chemistry:  Recent Labs   Lab 05/07/20  2242 05/08/20  0400 05/08/20  0817    148*  148* 150*  150*   K 3.6 3.4*  3.4* 3.3*  3.3*    114*  114* 117*  117*   CO2 24 24  24 24  24   *  125* 181*  181* 128*  128*  128*  128*   BUN 15 15  15 13  13   CREATININE 0.8 0.9  0.9 0.9  0.9   CALCIUM 8.7 8.9  8.9 8.6*  8.6*   PROT  --  6.2  --    ALBUMIN  --  3.3*  --    BILITOT  --  1.1*  --    ALKPHOS  --  198*  --    AST  --  44*  --    ALT  --  50*  --    ANIONGAP 10 10  10 9  9   ESTGFRAFRICA >60.0 >60.0  >60.0 >60.0  >60.0   EGFRNONAA >60.0 >60.0  >60.0 >60.0  >60.0   MG 2.3 2.3 2.3  2.3   PHOS 3.6 3.6 4.0  4.0     CBC:   Recent Labs   Lab 05/08/20  0400   WBC 10.69   RBC 4.98   HGB 15.1   HCT 46.6      MCV 94   MCH 30.3   MCHC 32.4     Cardiac Profile:  Recent Labs   Lab 05/07/20  0911 05/07/20  1230 05/07/20  1823 05/08/20  0400 05/08/20  0817   CPK  --  214*  --   --   --    CPKMB 1.2  --   --   " --   --    TROPONINI <0.030 0.031 <0.030 <0.030 <0.030     Diabetes:  Lab Results   Component Value Date    HGBA1C 4.9 06/27/2005     Medications: Pertinent Medications reviewed  Scheduled Meds:   chlorhexidine  15 mL Mouth/Throat BID    enoxparin  40 mg Subcutaneous Q12H    famotidine (PF)  20 mg Intravenous BID    hydrocortisone sodium succinate  100 mg Intravenous Q8H    mupirocin   Nasal BID    piperacillin-tazobactam (ZOSYN) IVPB  4.5 g Intravenous Q8H    sodium chloride 0.9% 50 mL with desmopressin (DDAVP) 1 mcg   Intravenous Q12H    vancomycin (VANCOCIN) IVPB  1,750 mg Intravenous Q12H     Continuous Infusions:   dextrose 5 % 100 mL/hr at 05/08/20 1215    norepinephrine bitartrate-D5W       PRN Meds:.calcium gluconate IVPB, calcium gluconate IVPB, calcium gluconate IVPB, calcium gluconate IVPB, calcium gluconate IVPB, calcium gluconate IVPB, dextrose 50%, dextrose 50%, glucagon (human recombinant), glucose, glucose, insulin aspart U-100, lorazepam, magnesium sulfate IVPB, magnesium sulfate IVPB, magnesium sulfate IVPB, magnesium sulfate IVPB, magnesium sulfate IVPB, potassium chloride in water **AND** potassium chloride in water, potassium chloride in water, potassium chloride in water, potassium chloride in water, sodium chloride, sodium chloride 0.9%, sodium phosphate IVPB, sodium phosphate IVPB, sodium phosphate IVPB, sodium phosphate IVPB, sodium phosphate IVPB, sodium phosphate IVPB, Pharmacy to dose Vancomycin consult **AND** vancomycin - pharmacy to dose    Estimated/Assessed Needs  Weight Used For Calorie Calculations: 106.3 kg (234 lb 5.6 oz)  Energy Calorie Requirements (kcal): 1788 (PSE)   Energy Need Method: [place]  Protein Requirements: 110 - 138 (2 - 2.5 g/kg IBW)   Weight Used For Protein Calculations: 55 kg (121 lb 4.1 oz)(IBW)     Estimated Fluid Requirement Method: RDA Method  RDA Method (mL): 1788       Nutrition Prescription Ordered  Current Diet Order: NPO     Evaluation of  Received Nutrient/Fluid Intake  Total Calories (kcal): 408  % Kcal Needs: 23%  Total Protein (gm): 0  % Protein Needs: 0%  IV Fluid (mL): 2400  Energy Calories Required: not meeting needs  Protein Required: not meeting needs  Fluid Required: meeting needs     Intake/Output Summary (Last 24 hours) at 5/8/2020 1257  Last data filed at 5/8/2020 1236  Gross per 24 hour   Intake 1150 ml   Output 4990 ml   Net -3840 ml      % Intake of Estimated Energy Needs: 0 - 25 %  % Meal Intake: NPO    Nutrition Risk  Level of Risk/Frequency of Follow-up: high     Monitor and Evaluation  Food and Nutrient Intake: energy intake  Food and Nutrient Adminstration: other (specify)(plans to feed )  Physical Activity and Function: nutrition-related ADLs and IADLs, factors affecting access to physical activity  Anthropometric Measurements: weight, weight change, body mass index  Biochemical Data, Medical Tests and Procedures: electrolyte and renal panel, glucose/endocrine profile, lipid profile, inflammatory profile, gastrointestinal profile  Nutrition-Focused Physical Findings: overall appearance     Nutrition Follow-Up  RD Follow-up?: Yes     Dionna Peng RD 05/08/2020 12:58 PM

## 2020-05-08 NOTE — ANESTHESIA PROCEDURE NOTES
Arterial    Diagnosis: hypothermic protocol s/p arrest    Patient location during procedure: ICU  Procedure start time: 5/7/2020 10:01 PM  Timeout: 5/7/2020 10:00 PM  Procedure end time: 5/7/2020 10:11 PM    Staffing  Authorizing Provider: Antonio Thomas MD  Performing Provider: Antonio Thomas MD    Anesthesiologist was present at the time of the procedure.    Preanesthetic Checklist  Completed: patient identified, timeout performed, IV checked, risks and benefits discussed, monitors and equipment checked and anesthesia consent givenArterial  Skin Prep: chlorhexidine gluconate  Local Infiltration: lidocaine  Orientation: right  Location: radial  Catheter Size: 20 G  Catheter placement by Anatomical landmarks. Heme positive aspiration all ports.Insertion Attempts: 1  Assessment  Dressing: sutured in place and taped and tegaderm  Patient: Tolerated well

## 2020-05-08 NOTE — PROGRESS NOTES
[facility] Medicine  Progress Note    Patient Name: Chelsea Solano  MRN: [id number]  Patient Class: IP- Inpatient   Admission Date: 5/7/2020  Length of Stay: 1 days  Attending Physician: Kaitlin Herrera DO  Primary Care Provider: Bertha Mcneil NP        Subjective:     Principal Problem:Cardiac arrest  Chief Complaint   Patient presents with    Cardiac Arrest       Interval History: af, bp soft. Intubated/ventilated. hypotermia protocols still underway.  Minimal uop overnight--> IVF added and now improved..  --> DI (DDAVP given). Neuro consulted for brain death evaluation --> plan to repeat EEG tomorrow following rewarm with apnea test. Meeting w Neuro, Pulm and patient'  where poor prognosis discussed.     Review of Systems   Unable to perform ROS: Intubated     Objective:     Vital Signs (Most Recent):  Temp: 96.6 °F (35.9 °C) (05/08/20 1230)  Pulse: 90 (05/08/20 1230)  Resp: (!) 26 (05/08/20 1230)  BP: (!) 96/45 (05/08/20 1230)  SpO2: 98 % (05/08/20 1230) Vital Signs (24h Range):  Temp:  [91.4 °F (33 °C)-97 °F (36.1 °C)] 96.6 °F (35.9 °C)  Pulse:  [61-90] 90  Resp:  [14-30] 26  SpO2:  [92 %-100 %] 98 %  BP: ()/(45-79) 96/45  Arterial Line BP: (100-123)/(50-67) 107/50     Weight: 106.3 kg (234 lb 5.6 oz)  Body mass index is 40.23 kg/m².    Intake/Output Summary (Last 24 hours) at 5/8/2020 1346  Last data filed at 5/8/2020 1236  Gross per 24 hour   Intake 1150 ml   Output 4890 ml   Net -3740 ml      Physical Exam   Constitutional: She appears well-developed and well-nourished.   Obese  intubated   HENT:   Head: Atraumatic.   Eyes: Conjunctivae are normal.   Pupils not reactive to light  Left pupil 7mm  Right pupil 3mm     Neck: Neck supple. No JVD present. No thyromegaly present.   Cardiovascular: Normal rate, regular rhythm and normal heart sounds. Exam reveals no gallop and no friction rub.   No murmur heard.  Pulmonary/Chest: Effort normal. She has no  wheezes. She has no rales.   intubated   Abdominal: Soft. Bowel sounds are normal. She exhibits no distension. There is no tenderness.   Musculoskeletal: Normal range of motion.   Neurological: She has normal reflexes.   Unresponsive to painful stimuli  not on sedation  Neg gag       Skin: Skin is warm and dry.   Nursing note and vitals reviewed.      Significant Labs:   CBC:   Recent Labs   Lab 05/07/20  0911 05/07/20  1411 05/08/20  0400   WBC 10.25  --  10.69   HGB 14.2  --  15.1   HCT 47.8 49 46.6     --  282     CMP:   Recent Labs   Lab 05/07/20  0911 05/07/20  1230  05/08/20  0400 05/08/20  0817 05/08/20  1217    140   < > 148*  148* 150*  150* 153*   K 4.6 3.5   < > 3.4*  3.4* 3.3*  3.3* 3.5   CL 98 107   < > 114*  114* 117*  117* 119*   CO2 25 24   < > 24  24 24  24 25   * 153*  153*   < > 181*  181* 128*  128*  128*  128* 126*  126*   BUN 16 19   < > 15  15 13  13 13   CREATININE 1.2 0.9   < > 0.9  0.9 0.9  0.9 0.9   CALCIUM 8.7 8.5*   < > 8.9  8.9 8.6*  8.6* 8.7   PROT 6.8 7.0  --  6.2  --   --    ALBUMIN 3.5 3.7  --  3.3*  --   --    BILITOT 0.6 0.4  --  1.1*  --   --    ALKPHOS 274* 269*  --  198*  --   --    * 129*  --  44*  --   --    ALT 60* 67*  --  50*  --   --    ANIONGAP 14 9   < > 10  10 9  9 9   EGFRNONAA 54.7* >60.0   < > >60.0  >60.0 >60.0  >60.0 >60.0    < > = values in this interval not displayed.     All pertinent labs within the past 24 hours have been reviewed.    Significant Imaging: eeg   Impression:  This is an abnormal EEG due to diffuse background suppression   with no clear evidence of cortical activity.     CXR  IMPRESSION:  1. Lines and tubes are stable. Nasogastric tube loops in the stomach  and terminates at the gastric cardia.  2. Bilateral upper lobe infiltrates are unchanged.    Echo  · Concentric left ventricular remodeling.  · Left ventricular systolic function. The estimated ejection fraction is 60%.  · Grade I (mild) left  ventricular diastolic dysfunction consistent with impaired relaxation.  · Mild right ventricular enlargement.  · Normal right ventricular systolic function.  · Mild tricuspid regurgitation.    Assessment/Plan:      Active Hospital Problems    Diagnosis  POA    *Cardiac arrest [I46.9]  Yes    Diabetes insipidus [E23.2]  Yes    Cerebral edema [G93.6]  Yes    Acute hypoxemic respiratory failure [J96.01]  Yes    Aspiration into lower respiratory tract [T17.800A]  Yes    Polypharmacy [Z79.899]  Not Applicable    Opiate overdose [T40.601A]  Yes    Benzodiazepine overdose [T42.4X1A]  Yes    Gastroesophageal reflux disease without esophagitis [K21.9]  Yes    Irritable bowel syndrome with diarrhea [K58.0]  Yes    Moderate episode of recurrent major depressive disorder [F33.1]  Yes    Diabetes [E11.9]  Yes    Anxiety [F41.9]  Yes    Mood disorder [F39]  Yes    Insomnia [G47.00]  Yes    Transaminitis [R74.0]  Yes    Hypertension [I10]  Yes      Resolved Hospital Problems   No resolved problems to display.     Cardiac Arrest   Acute respiratory failure  Cerebral edema  - Utox positive benzo and opiates  - etiology likely polypharmacy/aspiration leading to arrest  - pulm consulted, recs appreciated, case d/w Dr. Castorena plan to re-assess with EEG tomorrow following re-warming and electrolyte correction. He had long discussion with patient's  to discuss poor prognosis.   - cardiology consulted, recs appreciated   - neurology consulted, recs appreciated, plans for repeat eeg and brain death eval tomorrow.   - broad-spectrum antibiotics, vanc and Zosyn  - hypothermia protocols, re-warming to begin today  - sliding scale insulin and Accu-Cheks to maintain blood glucose levels  - CT head showing anoxic brain injury and cerebral edema   - EEG: no signs of cortical activity  - DDAVP for DI       Ppx: gi, pepcid, dvt, lovenox  Code: full  VTE Risk Mitigation (From admission, onward)         Ordered     enoxaparin  injection 40 mg  Every 12 hours      05/07/20 1134     IP VTE HIGH RISK PATIENT  Once      05/07/20 1134     Place sequential compression device  Until discontinued      05/07/20 1134                Kaitlin Herrera DO  Department of Hospital Medicine   [facility]

## 2020-05-08 NOTE — ANESTHESIA PROCEDURE NOTES
Central Line    Diagnosis: hypothermic protocol s/p arrest  Patient location during procedure: ICU  Procedure start time: 5/7/2020 10:15 PM  Timeout: 5/7/2020 10:15 PM  Procedure end time: 5/7/2020 10:32 PM    Staffing  Authorizing Provider: Antonio Thomas MD  Performing Provider: Antonio Thomas MD    Staffing  Anesthesiologist: Antonio Thomas MD  Performed: anesthesiologist   Anesthesiologist was present at the time of the procedure.  Preanesthetic Checklist  Completed: patient identified, timeout performed, IV checked, monitors and equipment checked and anesthesia consent given  Indication   Indication: hemodynamic monitoring, vascular access, med administration     Anesthesia   local infiltration    Central Line   Skin Prep: skin prepped with ChloraPrep, skin prep agent completely dried prior to procedure  maximum sterile barriers used during central venous catheter insertion  hand hygiene performed prior to central venous catheter insertion  Location: right, internal jugular.   Catheter type: triple lumen  Catheter Size: 7 Fr  Inserted Catheter Length: 14 cm  Ultrasound: vascular probe with ultrasound  Vessel Caliber: medium, patent  Vascular Doppler:  not done, compressibility normal  Needle advanced into vessel with real time Ultrasound guidance.  Guidewire confirmed in vessel.  Manometry: none  Insertion Attempts: 1   Securement:line sutured, chlorhexidine patch, sterile dressing applied and blood return through all ports    Post-Procedure   X-Ray: no pneumothorax on x-ray, placement verified by x-ray, tip termination and successful placement  Tip termination comments: atriocaval junction   Adverse Events:none    Guidewire Guidewire removed intact. Guidewire removed intact, verified with nurse.  Additional Notes  Easy placement  CXR no pneumothorax  Good position  OK to use Right IJ TLC

## 2020-05-08 NOTE — PROGRESS NOTES
Critical access hospital  Pulmonology  Progress Note    Subjective     05/08/2022:  No acute issues overnight.  Remains completely unresponsive off sedation.  Still massive targeted temperature management.     Review of Systems   Unable to perform ROS: Intubated      I have personally reviewed the following during today's evaluation:  past medical history, ROS, family history, social history, surgical history, current inpatient medications,drug allergies, vital signs over the past 24 hours, results of relevant diagnostic studies and nursing/provider documentation from the past 24 hours.     Objective     VS Temp:  [91.4 °F (33 °C)-97.3 °F (36.3 °C)]   Pulse:  [61-89]   Resp:  [14-34]   BP: ()/(51-83)   SpO2:  [92 %-100 %]   Arterial Line BP: (100-123)/(50-67)   Ideal body weight: 54.7 kg (120 lb 9.5 oz)  Adjusted ideal body weight: 75.3 kg (166 lb 1.5 oz)   I/O   Intake/Output Summary (Last 24 hours) at 5/8/2020 1059  Last data filed at 5/8/2020 0800  Gross per 24 hour   Intake 1750 ml   Output 3890 ml   Net -2140 ml        Vent SpO2 99%   Vent Mode: A/C  Oxygen Concentration (%):  [40] 40  Resp Rate Total:  [28 br/min-40 br/min] 28 br/min  Vt Set:  [380 mL] 380 mL  PEEP/CPAP:  [5 cmH20] 5 cmH20  Pressure Support:  [0 cmH20] 0 cmH20  Mean Airway Pressure:  [13 pcS81-27 cmH20] 14 cmH20     PE Physical Exam   Constitutional: She appears well-developed and well-nourished. She is intubated. She is obese.   HENT:   Head: Normocephalic.   Right Ear: External ear normal.   Left Ear: External ear normal.   Nose: Nose normal.   Mouth/Throat: Mallampati Score: unable to assess.   Right pupils 3 mm and not reactive to light.  Left pupil is 5 mm and not reactive to light.  Negative doll's eye reflex  Corneal reflex absent   Neck: No JVD present. No tracheal deviation present. No thyromegaly present.   Cardiovascular: Normal rate, regular rhythm, normal heart sounds and intact distal pulses.   No murmur heard.  Slightly  impaired systolic function on focused bedside ultrasonography performed in the emergency department.  I was unable to identify any focal wall motion abnormalities or pericardial fluid.  IVC was plump and not collapsing with inspiration.   Pulmonary/Chest: Normal expansion and symmetric chest wall expansion. She is intubated. She has no wheezes. She has rhonchi. She has no rales.   Abdominal: Soft. Bowel sounds are normal. She exhibits no distension. There is no tenderness.   Genitourinary:   Genitourinary Comments: Turpin catheter   Musculoskeletal: Normal range of motion. She exhibits no edema or deformity.   Lymphadenopathy: No supraclavicular adenopathy is present.     She has no cervical adenopathy.     She has no axillary adenopathy.   Neurological: She is unresponsive. She exhibits abnormal muscle tone. GCS eye subscore is 1. GCS verbal subscore is 1. GCS motor subscore is 1.   Absent gag, corneal, afferent pupillary light, and cough reflexes.  No response to noxious stimuli.  Not initiating spontaneous breaths.   Skin: Skin is warm and dry. No rash noted.   Nursing note and vitals reviewed.        Labs I have personally reviewed and interpreted all labs / diagnostic studies obtained over the past 24 hours, and relevant results are as follows:  Recent Labs   Lab 05/07/20  1230 05/07/20  1411  05/08/20  0400 05/08/20  0817   WBC  --   --   --  10.69  --    RBC  --   --   --  4.98  --    HGB  --   --   --  15.1  --    HCT  --  49  --  46.6  --    PLT  --   --   --  282  --    MCV  --   --   --  94  --    MCH  --   --   --  30.3  --    MCHC  --   --   --  32.4  --      --    < > 148*  148* 150*  150*   K 3.5  --    < > 3.4*  3.4* 3.3*  3.3*     --    < > 114*  114* 117*  117*   CO2 24  --    < > 24  24 24  24   BUN 19  --    < > 15  15 13  13   CREATININE 0.9  --    < > 0.9  0.9 0.9  0.9   MG  --   --    < > 2.3 2.3  2.3   ALT 67*  --   --  50*  --    *  --   --  44*  --    ALKPHOS  269*  --   --  198*  --    BILITOT 0.4  --   --  1.1*  --    PROT 7.0  --   --  6.2  --    ALBUMIN 3.7  --   --  3.3*  --    PH  --  7.375  --   --   --    PCO2  --  42.5  --   --   --    PO2  --  79*  --   --   --    HCO3  --  24.8  --   --   --    POCSATURATED  --  95  --   --   --    BE  --  0  --   --   --    INR  --   --   --  1.1  --    APTT  --   --   --  30.1  --    *  --   --   --   --    TROPONINI 0.031  --    < > <0.030 <0.030    < > = values in this interval not displayed.     Troponin: <0.030   Imaging I have personally reviewed and interpreted the following images and reviewed the associated Radiology report.  CXR: I have reviewed all pertinent results/findings within the past 24 hours and my personal findings are:  1. Lines and tubes are stable. Nasogastric tube loops in the stomach  TTE:  · Concentric left ventricular remodeling.  · Left ventricular systolic function. The estimated ejection fraction is 60%.  · Grade I (mild) left ventricular diastolic dysfunction consistent with impaired relaxation.  · Mild right ventricular enlargement.  · Normal right ventricular systolic function.  · Mild tricuspid regurgitation     Micro I have personally reviewed and interpreted the available culture data.  Relevant results are as follows.  Blood Culture   Lab Results   Component Value Date    LABBLOO No Growth to date 05/07/2020   , Sputum Culture   Lab Results   Component Value Date    GSRESP Moderate WBC's 05/07/2020    GSRESP >10 epithelial cells per low power field 05/07/2020    GSRESP Few Gram positive cocci 05/07/2020    GSRESP Few yeast 05/07/2020    GSRESP Few Gram positive rods 05/07/2020    GSRESP Rare Gram negative rods 05/07/2020    RESPIRATORYC Normal respiratory nilam 05/07/2020    and Urine Culture    Lab Results   Component Value Date    LABURIN No significant growth 03/16/2014      Medications Scheduled    chlorhexidine  15 mL Mouth/Throat BID    enoxparin  40 mg Subcutaneous Q12H     famotidine (PF)  20 mg Intravenous BID    mupirocin   Nasal BID    piperacillin-tazobactam (ZOSYN) IVPB  4.5 g Intravenous Q8H    vancomycin (VANCOCIN) IVPB  1,750 mg Intravenous Q12H      Continuous Infusions:    lactated ringers 125 mL/hr at 05/08/20 0900    norepinephrine bitartrate-D5W        PRN   calcium gluconate IVPB, calcium gluconate IVPB, calcium gluconate IVPB, calcium gluconate IVPB, calcium gluconate IVPB, calcium gluconate IVPB, dextrose 50%, dextrose 50%, glucagon (human recombinant), glucose, glucose, insulin aspart U-100, lorazepam, magnesium sulfate IVPB, magnesium sulfate IVPB, magnesium sulfate IVPB, magnesium sulfate IVPB, magnesium sulfate IVPB, potassium chloride in water **AND** potassium chloride in water, potassium chloride in water, potassium chloride in water, potassium chloride in water, sodium chloride 0.9%, sodium phosphate IVPB, sodium phosphate IVPB, sodium phosphate IVPB, sodium phosphate IVPB, sodium phosphate IVPB, sodium phosphate IVPB, Pharmacy to dose Vancomycin consult **AND** vancomycin - pharmacy to dose        Assessment       Active Hospital Problems    Diagnosis    *Cardiac arrest    Cerebral edema    Acute hypoxemic respiratory failure    Aspiration into lower respiratory tract    Polypharmacy    Opiate overdose    Benzodiazepine overdose    Gastroesophageal reflux disease without esophagitis    Irritable bowel syndrome with diarrhea    Moderate episode of recurrent major depressive disorder    Diabetes    Anxiety    Mood disorder    Insomnia    Transaminitis    Hypertension      My Impression:  Status post hypoxic cardiopulmonary arrest with apparent devastating anoxic brain injury.  Suspect that she may be brain dead, but we cannot determine this until her lecture light abnormalities of been corrected and she is normothermic.  Dr. Casper and I had a long and thorough conversation with the patient's  this morning he is acutely aware of our  concerns.    Plan     Neuro  · Supportive care.  · 24 hr of targeted temperature management to a temperature of 36° centigrade.  · Neurology following recommendations noted.  I appreciate their assistance.  · Will perform brain death exam tomorrow once her electrolyte abnormalities have been corrected she is normothermic.    Pulmonary  · Continue lung protective ventilation for now.  · Empiric antibiotics out of an abundance of caution.    Cardiac/Vascular  · No compelling evidence of ACS.  · Cardiology input appreciated.  No additional interventions or workup necessary at this point.    Renal/  · No acute issues.  · Appears that she is developing diabetes insipidus.  · Start free water infusion.    ID  · Empiric broad-spectrum antibiotics out of an abundance of caution.  · Surveillance cultures obtained.  Will deescalate as culture data becomes available.    Hematology  · No indication for blood products.  · Observation for now.    Endocrine  · Serial blood glucose monitoring.  · Correctional dose sliding scale insulin as needed to maintain moderate glycemic control.    GI  · Slight transaminitis likely a consequence of low-flow state.  · H2 blocker for stress ulcer prophylaxis.       Critical Care Time: Approximately >35 minutes    The patient is critically ill due to the following conditions that actively pose threat to life and bodily function:  Cardiac arrest requiring CPR / IV medications, Acute coma/unconsciousness not secondary to hypoglycemia    The patient is at high risk of death due to central nervous system failure, circulatory failure, respiratory failure and requiring ongoing treatment including invasive mechanical ventilation, frequent neurologic assessment to prevent further life-threatening deterioration.  Due to a high probability of clinically significant, life threatening deterioration, the patient required my highest level of preparedness to intervene emergently and I personally spent this  critical care time directly and personally managing the patient.     Critical care was time spent personally by me on the following activities:    Obtaining a history   Examination of patient.   Reviewing pulse oximetry.   Providing medical care at the patients bedside.   Developing a treatment plan with patient or surrogate and bedside caregivers   Ordering and reviewing laboratory studies, radiographic studies, pulse oximetry.   Ordering and performing treatments and interventions.   Evaluation of patient's response to treatment.   Discussions with consultants while on the unit and immediately available to the patient.   Re-evaluation of the patient's condition.   Documentation in the medical record.    This critical care time did not overlap with that of any other provider or involve time for any procedures.     King Castorena MD  Pulmonary / Critical Care Medicine  [facility]  05/08/2020  11:00 AM

## 2020-05-08 NOTE — NURSING
Received call from a lady name Digna Ron who states she is patients half sister. States they have the same father. Pts. Fathers name is Lemuel Ron ph # [phone] per Digna. Digna reports another sister named Sylvester Del Valle-her and the pt share the same mother.     Digna reports to me that she is not 100% sure that patient Chelsea Ron is legally  to the man who has been visiting the hospital [facility]. Digna states that her sister Chelsea is not legally  from her ex  Vincenzo Ball PH# [phone].     Dr. Herrera called per Digna request for her to call her with any info.   Kiesha Kovacs RN  5/8/2020

## 2020-05-08 NOTE — SUBJECTIVE & OBJECTIVE
Interval History: af, bp soft. Intubated/ventilated. hypotermia protocols still underway.  Minimal uop overnight--> IVF added and now improved..  --> DI (DDAVP given). Neuro consulted for brain death evaluation --> plan to repeat EEG tomorrow following rewarm with apnea test. Meeting w Neuro, Pulm and patient'  where poor prognosis discussed.     Review of Systems   Unable to perform ROS: Intubated     Objective:     Vital Signs (Most Recent):  Temp: 96.6 °F (35.9 °C) (05/08/20 1230)  Pulse: 90 (05/08/20 1230)  Resp: (!) 26 (05/08/20 1230)  BP: (!) 96/45 (05/08/20 1230)  SpO2: 98 % (05/08/20 1230) Vital Signs (24h Range):  Temp:  [91.4 °F (33 °C)-97 °F (36.1 °C)] 96.6 °F (35.9 °C)  Pulse:  [61-90] 90  Resp:  [14-30] 26  SpO2:  [92 %-100 %] 98 %  BP: ()/(45-79) 96/45  Arterial Line BP: (100-123)/(50-67) 107/50     Weight: 106.3 kg (234 lb 5.6 oz)  Body mass index is 40.23 kg/m².    Intake/Output Summary (Last 24 hours) at 5/8/2020 1346  Last data filed at 5/8/2020 1236  Gross per 24 hour   Intake 1150 ml   Output 4890 ml   Net -3740 ml      Physical Exam   Constitutional: She appears well-developed and well-nourished.   Obese  intubated   HENT:   Head: Atraumatic.   Eyes: Conjunctivae are normal.   Pupils not reactive to light  Left pupil 7mm  Right pupil 3mm     Neck: Neck supple. No JVD present. No thyromegaly present.   Cardiovascular: Normal rate, regular rhythm and normal heart sounds. Exam reveals no gallop and no friction rub.   No murmur heard.  Pulmonary/Chest: Effort normal. She has no wheezes. She has no rales.   intubated   Abdominal: Soft. Bowel sounds are normal. She exhibits no distension. There is no tenderness.   Musculoskeletal: Normal range of motion.   Neurological: She has normal reflexes.   Unresponsive to painful stimuli  not on sedation  Neg gag       Skin: Skin is warm and dry.   Nursing note and vitals reviewed.      Significant Labs:   CBC:   Recent Labs   Lab  05/07/20  0911 05/07/20  1411 05/08/20  0400   WBC 10.25  --  10.69   HGB 14.2  --  15.1   HCT 47.8 49 46.6     --  282     CMP:   Recent Labs   Lab 05/07/20  0911 05/07/20  1230  05/08/20  0400 05/08/20  0817 05/08/20  1217    140   < > 148*  148* 150*  150* 153*   K 4.6 3.5   < > 3.4*  3.4* 3.3*  3.3* 3.5   CL 98 107   < > 114*  114* 117*  117* 119*   CO2 25 24   < > 24  24 24  24 25   * 153*  153*   < > 181*  181* 128*  128*  128*  128* 126*  126*   BUN 16 19   < > 15  15 13  13 13   CREATININE 1.2 0.9   < > 0.9  0.9 0.9  0.9 0.9   CALCIUM 8.7 8.5*   < > 8.9  8.9 8.6*  8.6* 8.7   PROT 6.8 7.0  --  6.2  --   --    ALBUMIN 3.5 3.7  --  3.3*  --   --    BILITOT 0.6 0.4  --  1.1*  --   --    ALKPHOS 274* 269*  --  198*  --   --    * 129*  --  44*  --   --    ALT 60* 67*  --  50*  --   --    ANIONGAP 14 9   < > 10  10 9  9 9   EGFRNONAA 54.7* >60.0   < > >60.0  >60.0 >60.0  >60.0 >60.0    < > = values in this interval not displayed.     All pertinent labs within the past 24 hours have been reviewed.    Significant Imaging: eeg   Impression:  This is an abnormal EEG due to diffuse background suppression   with no clear evidence of cortical activity.     CXR  IMPRESSION:  1. Lines and tubes are stable. Nasogastric tube loops in the stomach  and terminates at the gastric cardia.  2. Bilateral upper lobe infiltrates are unchanged.

## 2020-05-08 NOTE — CONSULTS
[facility]  Neurology  Consult Note    Patient Name: Chelsea Solano  MRN: [id number]  Admission Date: 5/7/2020  Hospital Length of Stay: 1 days  Code Status: Full Code   Attending Provider: Dr Herrera  Consulting Provider: Dr Casper  Primary Care Physician: Bertha Mcneil NP  Principal Problem:Cardiac arrest      Subjective:     Chief Complaint:  Cardiac arrest    HPI from EMR: 44-year-old female with past medical history of diabetes, hypertension and multiple psychiatric diagnosis including ADHD, bipolar, major depressive disorder brought in by EMS following cardiac arrest in field.  History obtained from patient's  and ED physician.  Per  patient was in usual state of health and last known normal around 530-600 a.m. this morning.  They returned to sleep and  woke up at around 7:15 a.m. and noticed that patient was unresponsive, pulseless and covered in vomit.  He began CPR and notified EMS. On EMS arrival, ACLS continued and patient received epi x 4 and Narcan with ROSC achieved after about 20 min..  Patient was intubated in the field.    In ED,  re-intubated 2/2 issues ventilating patient. CXR right upper lobe infection vs atelectasis, CT head obtained showing global hypoxic ischemic injury and diffuse cerebral edema.  Utox + opiates and benzos.   Per , patient took her prescribed nighttime medications.  He initially told ER physician that patient had voiced suicidal ideation about 1 week ago but when discussed again he denied.     Neurological Consult: Pt seen and examined and POC discussed with Dr. Casper. Neuro consulted for brain death evaluation. Pt nonresponsive, intubated, and without sedation.  Her temp was greater than 36C.  She was not on vasopressors.  came to bedside.  Past Medical History:   Diagnosis Date    ADHD (attention deficit hyperactivity disorder)     Anxiety     Bipolar 2 disorder     Depression     Diabetes mellitus      Hypertension 3/16/2013    Kidney stones March 2014    Migraine headache     TIA (transient ischemic attack)     UTI (urinary tract infection) March 2014       Past Surgical History:   Procedure Laterality Date    CHOLECYSTECTOMY      HYSTERECTOMY      INSERTION OF IMPLANTABLE LOOP RECORDER      KNEE SURGERY  2003    WRIST SURGERY  2001       Review of patient's allergies indicates:   Allergen Reactions    Toradol [ketorolac] Hives    Nsaids (non-steroidal anti-inflammatory drug)     Ondansetron Hives    Stadol [butorphanol tartrate] Other (See Comments)     hallucinations       Current Neurological Medications: none    No current facility-administered medications on file prior to encounter.      Current Outpatient Medications on File Prior to Encounter   Medication Sig    acetaminophen (TYLENOL) 325 MG tablet Take 325 mg by mouth every 6 (six) hours as needed for Pain.    albuterol (PROVENTIL/VENTOLIN HFA) 90 mcg/actuation inhaler Inhale 2 puffs into the lungs as needed.    albuterol-ipratropium (DUO-NEB) 2.5 mg-0.5 mg/3 mL nebulizer solution Take 3 mLs by nebulization every 6 (six) hours as needed for Wheezing. Rescue    clonazePAM (KLONOPIN) 2 MG Tab Take 2 mg by mouth 3 (three) times daily as needed.    cyclobenzaprine (FLEXERIL) 10 MG tablet Take 10 mg by mouth 3 (three) times daily as needed for Muscle spasms.    escitalopram oxalate (LEXAPRO) 10 MG tablet Take 1 tablet (10 mg total) by mouth once daily.    ibuprofen (ADVIL,MOTRIN) 200 MG tablet Take 200 mg by mouth every 6 (six) hours as needed for Pain.    lidocaine (LIDODERM) 5 % Place 1 patch onto the skin every 24 hours. Remove & Discard patch within 12 hours or as directed by MD    loperamide (IMODIUM A-D) 2 mg Tab Take 2 mg by mouth 2 (two) times daily.    melatonin (MELATIN) Take 5 mg by mouth daily as needed for Insomnia.    methocarbamol (ROBAXIN) 500 MG Tab Take 500 mg by mouth 3 (three) times daily.    metoprolol tartrate  (LOPRESSOR) 50 MG tablet Take 1 tablet (50 mg total) by mouth 2 (two) times daily.    pantoprazole (PROTONIX) 20 MG tablet Take 1 tablet (20 mg total) by mouth once daily.    pramipexole 1.5 mg Tb24 Take 1.5 mg by mouth every evening.    quetiapine (SEROQUEL) 400 MG tablet Take 400 mg by mouth once daily at 6am.    zolpidem (AMBIEN) 10 mg Tab Take 10 mg by mouth daily as needed.    dextroamphetamine-amphetamine 30 mg Tab Take 1 tablet by mouth daily as needed.      Family History     Problem Relation (Age of Onset)    Cancer Father, Maternal Grandmother    Diabetes Father    Heart disease Mother, Father    Hypertension Mother    Schizophrenia Mother        Tobacco Use    Smoking status: Former Smoker     Packs/day: 1.00     Years: 20.00     Pack years: 20.00     Last attempt to quit: 3/11/2012     Years since quittin.1    Smokeless tobacco: Never Used   Substance and Sexual Activity    Alcohol use: Yes     Alcohol/week: 0.0 standard drinks    Drug use: Yes     Types: Hydrocodone    Sexual activity: Yes     Partners: Male     Birth control/protection: None     Review of Systems   Unable to perform ROS: Acuity of condition     Objective:     Vital Signs (Most Recent):  Temp: 96.8 °F (36 °C) (20 1030)  Pulse: 88 (20 1030)  Resp: (!) 28 (20 1030)  BP: (!) 110/52 (20 1005)  SpO2: 99 % (20 1030) Vital Signs (24h Range):  Temp:  [91.4 °F (33 °C)-97 °F (36.1 °C)] 96.8 °F (36 °C)  Pulse:  [61-89] 88  Resp:  [14-30] 28  SpO2:  [92 %-100 %] 99 %  BP: ()/(51-80) 110/52  Arterial Line BP: (100-123)/(50-67) 107/50     Weight: 106.3 kg (234 lb 5.6 oz)  Body mass index is 40.23 kg/m².    Physical Exam   Constitutional: She appears well-developed and well-nourished.   HENT:   Head: Normocephalic and atraumatic.   Cardiovascular: Normal rate.   Pulmonary/Chest:   intubated   Musculoskeletal:   Passive ROM only   Neurological: GCS eye subscore is 1. GCS verbal subscore is 1. GCS motor  subscore is 1.   nonresponsive   Skin: Skin is warm and dry.   Nursing note and vitals reviewed.      NEUROLOGICAL EXAMINATION:     MENTAL STATUS   Level of consciousness: unresponsive to painful stimuli    CRANIAL NERVES     CN III, IV, VI   Right pupil: Size: 4 mm. Reactivity: non-reactive.   Left pupil: Size: 5 mm. Reactivity: non-reactive.        Oculocephalic reflex negative  Cornea reflex negative  Gag reflex negative  Cough reflex negative  Oculovestibular reflex negative  No pupillary response     MOTOR EXAM        Limited assessment     SENSORY EXAM        nonresponsive to noxious stimuli  No response to supraorbital pinch     GAIT AND COORDINATION        JENNIFER       Significant Labs  Lab Results   Component Value Date    WBC 10.69 05/08/2020    HGB 15.1 05/08/2020    HCT 46.6 05/08/2020    MCV 94 05/08/2020     05/08/2020     CMP  Sodium   Date Value Ref Range Status   05/08/2020 150 (H) 136 - 145 mmol/L Final   05/08/2020 150 (H) 136 - 145 mmol/L Final     Potassium   Date Value Ref Range Status   05/08/2020 3.3 (L) 3.5 - 5.1 mmol/L Final   05/08/2020 3.3 (L) 3.5 - 5.1 mmol/L Final     Chloride   Date Value Ref Range Status   05/08/2020 117 (H) 95 - 110 mmol/L Final   05/08/2020 117 (H) 95 - 110 mmol/L Final     CO2   Date Value Ref Range Status   05/08/2020 24 23 - 29 mmol/L Final   05/08/2020 24 23 - 29 mmol/L Final     Glucose   Date Value Ref Range Status   05/08/2020 128 (H) 70 - 110 mg/dL Final   05/08/2020 128 (H) 70 - 110 mg/dL Final   05/08/2020 128 (H) 70 - 110 mg/dL Final   05/08/2020 128 (H) 70 - 110 mg/dL Final     BUN, Bld   Date Value Ref Range Status   05/08/2020 13 6 - 20 mg/dL Final   05/08/2020 13 6 - 20 mg/dL Final     Creatinine   Date Value Ref Range Status   05/08/2020 0.9 0.5 - 1.4 mg/dL Final   05/08/2020 0.9 0.5 - 1.4 mg/dL Final   03/19/2013 0.7 0.5 - 1.4 mg/dL Final     Calcium   Date Value Ref Range Status   05/08/2020 8.6 (L) 8.7 - 10.5 mg/dL Final   05/08/2020 8.6 (L)  8.7 - 10.5 mg/dL Final   03/19/2013 9.2 8.7 - 10.5 mg/dL Final     Total Protein   Date Value Ref Range Status   05/08/2020 6.2 6.0 - 8.4 g/dL Final     Albumin   Date Value Ref Range Status   05/08/2020 3.3 (L) 3.5 - 5.2 g/dL Final     Total Bilirubin   Date Value Ref Range Status   05/08/2020 1.1 (H) 0.1 - 1.0 mg/dL Final     Comment:     For infants and newborns, interpretation of results should be based  on gestational age, weight and in agreement with clinical  observations.  Premature Infant recommended reference ranges:  Up to 24 hours.............<8.0 mg/dL  Up to 48 hours............<12.0 mg/dL  3-5 days..................<15.0 mg/dL  6-29 days.................<15.0 mg/dL       Alkaline Phosphatase   Date Value Ref Range Status   05/08/2020 198 (H) 55 - 135 U/L Final   03/19/2013 221 (H) 55 - 135 U/L Final     AST   Date Value Ref Range Status   05/08/2020 44 (H) 10 - 40 U/L Final   03/19/2013 39 10 - 40 U/L Final     ALT   Date Value Ref Range Status   05/08/2020 50 (H) 10 - 44 U/L Final     Anion Gap   Date Value Ref Range Status   05/08/2020 9 8 - 16 mmol/L Final   05/08/2020 9 8 - 16 mmol/L Final   03/19/2013 10 5 - 15 meq/L Final     eGFR if    Date Value Ref Range Status   05/08/2020 >60.0 >60 mL/min/1.73 m^2 Final   05/08/2020 >60.0 >60 mL/min/1.73 m^2 Final     eGFR if non    Date Value Ref Range Status   05/08/2020 >60.0 >60 mL/min/1.73 m^2 Final     Comment:     Calculation used to obtain the estimated glomerular filtration  rate (eGFR) is the CKD-EPI equation.      05/08/2020 >60.0 >60 mL/min/1.73 m^2 Final     Comment:     Calculation used to obtain the estimated glomerular filtration  rate (eGFR) is the CKD-EPI equation.          Significant Imaging:   X-Ray Chest AP Portable  PROCEDURE:   XR CHEST AP PORTABLE  dated  5/8/2020 4:25 AM    CLINICAL HISTORY:   Female 44 years of age.   [place]    TECHNIQUE: AP view of the chest obtained portably at 4:25 AM.    PREVIOUS  STUDIES:  May 7, 2020    FINDINGS:    Endotracheal tube terminates 1 cm above the ari. NG tube loops in  the stomach and terminates at the gastric cardia. Right IJ central  vascular catheter is in the lower SVC. Cardiac and mediastinal  contours are stable. A cardiac loop recorder is present. Medial right  worse than left upper lobe streaky infiltrates are stable. There is no  pleural effusion or pneumothorax.    IMPRESSION:    1. Lines and tubes are stable. Nasogastric tube loops in the stomach  and terminates at the gastric cardia.  2. Bilateral upper lobe infiltrates are unchanged.    Electronically Signed by Nadia Prajapati on 2020 7:11 AM    CT brain:  Markedly abnormal appearing exam as above and findings of global hypoxic ischemic injury (anoxic brain injury), with diffuse cerebral edema (with effacement of the saw slightly, suprasellar/basilar cisterns and foramen magnum).    EE/8    This is an abnormal EEG due to diffuse background suppression   with no clear evidence of cortical activity.   Assessment and Plan:  45 y/o female s/p cardiac arrest    Cerebral Edema  -EEG noted above: no signs of cortical activity  -CT head: global hypoxic ischemic injury  -Repeat EE/8 afternoon    Brain Death Evaluation:  Pt is not on sedation or vasopressors.  She is above 36C. SBP greater than 100.  -REFLEXES:  Pupillary response absent  Oculocephalic reflex absent  Oculovestibular reflex absent  Cornea reflex absent  Gag reflex absent  Cough reflex absent  Supraorbital pinch negative  -EEG : pending  -CT noted above  -Plan for apnea test tomorrow   -Will re-evaluate reflexes tomorrow    Cardiac Arrest  -ROSC in field      Hypoxemic Respiratory Failure  -Pt intubated  -Pulmonology managing    Dr Casper and Dr Castorena discussed prognosis with pt's .  MIRTA Osei and myself also present.         Active Diagnoses:    Diagnosis Date Noted POA    PRINCIPAL PROBLEM:  Cardiac arrest [I46.9] 2020 Yes     Cerebral edema [G93.6] 05/07/2020 Yes    Acute hypoxemic respiratory failure [J96.01] 05/07/2020 Yes    Aspiration into lower respiratory tract [T17.800A] 05/07/2020 Yes    Polypharmacy [Z79.899] 05/07/2020 Not Applicable    Opiate overdose [T40.601A] 05/07/2020 Yes    Benzodiazepine overdose [T42.4X1A] 05/07/2020 Yes    Gastroesophageal reflux disease without esophagitis [K21.9] 02/24/2020 Yes    Irritable bowel syndrome with diarrhea [K58.0] 02/24/2020 Yes    Moderate episode of recurrent major depressive disorder [F33.1] 02/24/2020 Yes    Diabetes [E11.9] 03/16/2014 Yes    Anxiety [F41.9] 01/07/2014 Yes    Mood disorder [F39] 01/07/2014 Yes    Insomnia [G47.00] 03/25/2013 Yes    Transaminitis [R74.0] 03/25/2013 Yes    Hypertension [I10] 03/16/2013 Yes      Problems Resolved During this Admission:       VTE Risk Mitigation (From admission, onward)         Ordered     enoxaparin injection 40 mg  Every 12 hours      05/07/20 1134     IP VTE HIGH RISK PATIENT  Once      05/07/20 1134     Place sequential compression device  Until discontinued      05/07/20 1134                Thank you for your consult. I will follow-up with patient. Please contact us if you have any additional questions.    Willa Washington NP  Neurology  [facility]    I have seen the patient, reviewed the Nurse Practitioner's consultation note. I have personally interviewed and examined the patient at bedside and agree with the findings, with the following additions and modifications:    45yo woman who presented with out of hospital cardiac arrest. She was intubated in the field and achieved ROSC after 20 minutes. Hypothermia protocol was initiated.    On my exam this morning, she was intubated, not on sedation, with a temp of 96.8 Farenheit (36 Celsius):  No verbal output.  No eye opening.  No response to noxious stimulation(sternal rub, supraorbital pressure, TMJ pressure, nailbed  pressure on all extremities - she  did have one witnessed brief tremor seen in her left arm with nailbed pressure on the opposite arm, but this was not reproducible.)  No brainstem reflexes were elicited. Pupils fixed and mid-dilated (left about 2mm more than right), no corneal reflex on both sides, no oculocephalic reflex, no oculovestibular reflex on both sides, no cough or gag.    I reviewed her EEG done yesterday. It did not show evidence of cortical activity.    A/P  Hypoxic ischemic encephalopathy  -She has no observable evidence of brain activity currently.  -her findings could potentially be consistent with the clinical diagnosis of brain death; however, the confounding factor of hypothermia remains present.  -I had a prolonged conversation with her  in the presence of Dr. Castorena and ICU nurse. I explained her devastating prognosis, which is irreversible, and the possibility of her being brain dead. Dr. Castorena agreed with this assessment. I answered his questions regarding her neurologic prognosis.  -If she remains hemodynamically stable tomorrow while she is normothermic, I would recommend an apnea test and repeat neurologic exam, as well as a repeat EEG with brain death protocol.    I spent 30 minutes of critical care time with this patient, which was spent directly examining the patient as well as coordinating care and explaining treatment and prognosis to the patient's next of kin.

## 2020-05-09 VITALS
BODY MASS INDEX: 40.01 KG/M2 | WEIGHT: 234.38 LBS | OXYGEN SATURATION: 100 % | HEART RATE: 131 BPM | HEIGHT: 64 IN | SYSTOLIC BLOOD PRESSURE: 121 MMHG | TEMPERATURE: 97 F | RESPIRATION RATE: 26 BRPM | DIASTOLIC BLOOD PRESSURE: 76 MMHG

## 2020-05-09 LAB
ABO + RH BLD: NORMAL
ALBUMIN SERPL BCP-MCNC: 2.8 G/DL (ref 3.5–5.2)
ALBUMIN SERPL BCP-MCNC: 3.2 G/DL (ref 3.5–5.2)
ALBUMIN SERPL BCP-MCNC: 3.2 G/DL (ref 3.5–5.2)
ALLENS TEST: ABNORMAL
ALP SERPL-CCNC: 217 U/L (ref 55–135)
ALP SERPL-CCNC: 228 U/L (ref 55–135)
ALP SERPL-CCNC: 228 U/L (ref 55–135)
ALT SERPL W/O P-5'-P-CCNC: 32 U/L (ref 10–44)
ALT SERPL W/O P-5'-P-CCNC: 37 U/L (ref 10–44)
ALT SERPL W/O P-5'-P-CCNC: 37 U/L (ref 10–44)
AMYLASE SERPL-CCNC: 70 U/L (ref 20–110)
ANION GAP SERPL CALC-SCNC: 10 MMOL/L (ref 8–16)
ANION GAP SERPL CALC-SCNC: 9 MMOL/L (ref 8–16)
APTT PPP: 25.5 SEC (ref 23.6–33.3)
APTT PPP: 29.9 SEC (ref 23.6–33.3)
AST SERPL-CCNC: 24 U/L (ref 10–40)
AST SERPL-CCNC: 24 U/L (ref 10–40)
AST SERPL-CCNC: 28 U/L (ref 10–40)
BACTERIA SPEC AEROBE CULT: NORMAL
BASOPHILS # BLD AUTO: 0.03 K/UL (ref 0–0.2)
BASOPHILS # BLD AUTO: 0.03 K/UL (ref 0–0.2)
BASOPHILS NFR BLD: 0 % (ref 0–1.9)
BASOPHILS NFR BLD: 0.2 % (ref 0–1.9)
BASOPHILS NFR BLD: 0.2 % (ref 0–1.9)
BILIRUB DIRECT SERPL-MCNC: 0.3 MG/DL (ref 0.1–0.3)
BILIRUB SERPL-MCNC: 1 MG/DL (ref 0.1–1)
BILIRUB SERPL-MCNC: 1 MG/DL (ref 0.1–1)
BILIRUB SERPL-MCNC: 1.2 MG/DL (ref 0.1–1)
BILIRUB UR QL STRIP: NEGATIVE
BUN SERPL-MCNC: 14 MG/DL (ref 6–20)
BUN SERPL-MCNC: 15 MG/DL (ref 6–20)
CALCIUM SERPL-MCNC: 8.1 MG/DL (ref 8.7–10.5)
CALCIUM SERPL-MCNC: 8.4 MG/DL (ref 8.7–10.5)
CATH EF ESTIMATED: 35 %
CHLORIDE SERPL-SCNC: 114 MMOL/L (ref 95–110)
CHLORIDE SERPL-SCNC: 116 MMOL/L (ref 95–110)
CHLORIDE SERPL-SCNC: 117 MMOL/L (ref 95–110)
CK MB SERPL-MCNC: 22.3 NG/ML (ref 0.1–6.5)
CK SERPL-CCNC: 256 U/L (ref 20–180)
CLARITY UR: CLEAR
CO2 SERPL-SCNC: 23 MMOL/L (ref 23–29)
COLOR UR: YELLOW
CREAT SERPL-MCNC: 1.1 MG/DL (ref 0.5–1.4)
CREAT SERPL-MCNC: 1.2 MG/DL (ref 0.5–1.4)
DELSYS: ABNORMAL
DIFFERENTIAL METHOD: ABNORMAL
EOSINOPHIL # BLD AUTO: 0 K/UL (ref 0–0.5)
EOSINOPHIL # BLD AUTO: 0 K/UL (ref 0–0.5)
EOSINOPHIL NFR BLD: 0 % (ref 0–8)
EOSINOPHIL NFR BLD: 0.1 % (ref 0–8)
EOSINOPHIL NFR BLD: 0.1 % (ref 0–8)
ERYTHROCYTE [DISTWIDTH] IN BLOOD BY AUTOMATED COUNT: 15.3 % (ref 11.5–14.5)
ERYTHROCYTE [DISTWIDTH] IN BLOOD BY AUTOMATED COUNT: 15.3 % (ref 11.5–14.5)
ERYTHROCYTE [DISTWIDTH] IN BLOOD BY AUTOMATED COUNT: 15.5 % (ref 11.5–14.5)
ERYTHROCYTE [SEDIMENTATION RATE] IN BLOOD BY WESTERGREN METHOD: 26 MM/H
EST. GFR  (AFRICAN AMERICAN): >60 ML/MIN/1.73 M^2
EST. GFR  (NON AFRICAN AMERICAN): 54.7 ML/MIN/1.73 M^2
EST. GFR  (NON AFRICAN AMERICAN): >60 ML/MIN/1.73 M^2
ESTIMATED AVG GLUCOSE: 111 MG/DL (ref 68–131)
FIO2: 50
FLOW: 12
FLOW: 12
GGT SERPL-CCNC: 97 U/L (ref 8–55)
GLUCOSE SERPL-MCNC: 140 MG/DL (ref 70–110)
GLUCOSE SERPL-MCNC: 140 MG/DL (ref 70–110)
GLUCOSE SERPL-MCNC: 171 MG/DL (ref 70–110)
GLUCOSE SERPL-MCNC: 176 MG/DL (ref 70–110)
GLUCOSE SERPL-MCNC: 193 MG/DL (ref 70–110)
GLUCOSE SERPL-MCNC: 193 MG/DL (ref 70–110)
GLUCOSE SERPL-MCNC: 89 MG/DL (ref 70–110)
GLUCOSE UR QL STRIP: ABNORMAL
GRAM STN SPEC: NORMAL
HBA1C MFR BLD HPLC: 5.5 % (ref 4.5–6.2)
HCO3 UR-SCNC: 22.5 MMOL/L (ref 24–28)
HCO3 UR-SCNC: 26.6 MMOL/L (ref 24–28)
HCO3 UR-SCNC: 27.4 MMOL/L (ref 24–28)
HCO3 UR-SCNC: 31.3 MMOL/L (ref 24–28)
HCO3 UR-SCNC: 31.9 MMOL/L (ref 24–28)
HCT VFR BLD AUTO: 50.9 % (ref 37–48.5)
HCT VFR BLD AUTO: 50.9 % (ref 37–48.5)
HCT VFR BLD AUTO: 51.1 % (ref 37–48.5)
HCT VFR BLD CALC: 48 %PCV (ref 36–54)
HGB BLD-MCNC: 16.2 G/DL (ref 12–16)
HGB BLD-MCNC: 16.3 G/DL (ref 12–16)
HGB BLD-MCNC: 16.3 G/DL (ref 12–16)
HGB UR QL STRIP: NEGATIVE
IMM GRANULOCYTES # BLD AUTO: 0.08 K/UL (ref 0–0.04)
IMM GRANULOCYTES # BLD AUTO: 0.08 K/UL (ref 0–0.04)
IMM GRANULOCYTES # BLD AUTO: ABNORMAL K/UL (ref 0–0.04)
IMM GRANULOCYTES NFR BLD AUTO: 0.6 % (ref 0–0.5)
IMM GRANULOCYTES NFR BLD AUTO: 0.6 % (ref 0–0.5)
IMM GRANULOCYTES NFR BLD AUTO: ABNORMAL % (ref 0–0.5)
INR PPP: 1.1
INR PPP: 1.1
KETONES UR QL STRIP: NEGATIVE
LDH SERPL L TO P-CCNC: 295 U/L (ref 110–260)
LEUKOCYTE ESTERASE UR QL STRIP: NEGATIVE
LIPASE SERPL-CCNC: 30 U/L (ref 4–60)
LYMPHOCYTES # BLD AUTO: 1.2 K/UL (ref 1–4.8)
LYMPHOCYTES # BLD AUTO: 1.2 K/UL (ref 1–4.8)
LYMPHOCYTES NFR BLD: 8 % (ref 18–48)
LYMPHOCYTES NFR BLD: 8.1 % (ref 18–48)
LYMPHOCYTES NFR BLD: 8.1 % (ref 18–48)
MAGNESIUM SERPL-MCNC: 2.1 MG/DL (ref 1.6–2.6)
MAGNESIUM SERPL-MCNC: 2.2 MG/DL (ref 1.6–2.6)
MAGNESIUM SERPL-MCNC: 2.2 MG/DL (ref 1.6–2.6)
MCH RBC QN AUTO: 30.3 PG (ref 27–31)
MCH RBC QN AUTO: 30.3 PG (ref 27–31)
MCH RBC QN AUTO: 30.6 PG (ref 27–31)
MCHC RBC AUTO-ENTMCNC: 31.7 G/DL (ref 32–36)
MCHC RBC AUTO-ENTMCNC: 32 G/DL (ref 32–36)
MCHC RBC AUTO-ENTMCNC: 32 G/DL (ref 32–36)
MCV RBC AUTO: 95 FL (ref 82–98)
MCV RBC AUTO: 95 FL (ref 82–98)
MCV RBC AUTO: 97 FL (ref 82–98)
MODE: ABNORMAL
MONOCYTES # BLD AUTO: 0.7 K/UL (ref 0.3–1)
MONOCYTES # BLD AUTO: 0.7 K/UL (ref 0.3–1)
MONOCYTES NFR BLD: 10 % (ref 4–15)
MONOCYTES NFR BLD: 5.2 % (ref 4–15)
MONOCYTES NFR BLD: 5.2 % (ref 4–15)
NEUTROPHILS # BLD AUTO: 12.2 K/UL (ref 1.8–7.7)
NEUTROPHILS # BLD AUTO: 12.2 K/UL (ref 1.8–7.7)
NEUTROPHILS NFR BLD: 62 % (ref 38–73)
NEUTROPHILS NFR BLD: 85.8 % (ref 38–73)
NEUTROPHILS NFR BLD: 85.8 % (ref 38–73)
NEUTS BAND NFR BLD MANUAL: 20 %
NITRITE UR QL STRIP: NEGATIVE
NRBC BLD-RTO: 0 /100 WBC
PCO2 BLDA: 44.4 MMHG (ref 35–45)
PCO2 BLDA: 46.3 MMHG (ref 35–45)
PCO2 BLDA: 47.2 MMHG (ref 35–45)
PCO2 BLDA: 83.7 MMHG (ref 35–45)
PCO2 BLDA: 84.7 MMHG (ref 35–45)
PEEP: 5
PEEP: 8
PH SMN: 7.18 [PH] (ref 7.35–7.45)
PH SMN: 7.18 [PH] (ref 7.35–7.45)
PH SMN: 7.29 [PH] (ref 7.35–7.45)
PH SMN: 7.37 [PH] (ref 7.35–7.45)
PH SMN: 7.38 [PH] (ref 7.35–7.45)
PH UR STRIP: 6 [PH] (ref 5–8)
PHOSPHATE SERPL-MCNC: 4.4 MG/DL (ref 2.7–4.5)
PHOSPHATE SERPL-MCNC: 5.7 MG/DL (ref 2.7–4.5)
PHOSPHATE SERPL-MCNC: 6.3 MG/DL (ref 2.7–4.5)
PLATELET # BLD AUTO: 276 K/UL (ref 150–350)
PLATELET # BLD AUTO: 278 K/UL (ref 150–350)
PLATELET # BLD AUTO: 278 K/UL (ref 150–350)
PLATELET BLD QL SMEAR: ABNORMAL
PMV BLD AUTO: 10.3 FL (ref 9.2–12.9)
PMV BLD AUTO: 9.8 FL (ref 9.2–12.9)
PMV BLD AUTO: 9.8 FL (ref 9.2–12.9)
PO2 BLDA: 39 MMHG (ref 80–100)
PO2 BLDA: 50 MMHG (ref 80–100)
PO2 BLDA: 77 MMHG (ref 80–100)
PO2 BLDA: 84 MMHG (ref 80–100)
PO2 BLDA: 92 MMHG (ref 80–100)
POC BE: -4 MMOL/L
POC BE: 2 MMOL/L
POC BE: 2 MMOL/L
POC BE: 3 MMOL/L
POC BE: 4 MMOL/L
POC IONIZED CALCIUM: 1.14 MMOL/L (ref 1.06–1.42)
POC PCO2 TEMP: 44.8 MMHG
POC PH TEMP: 7.38
POC PO2 TEMP: 94 MMHG
POC SATURATED O2: 58 % (ref 95–100)
POC SATURATED O2: 73 % (ref 95–100)
POC SATURATED O2: 94 % (ref 95–100)
POC SATURATED O2: 96 % (ref 95–100)
POC SATURATED O2: 97 % (ref 95–100)
POC TCO2: 24 MMOL/L (ref 23–27)
POC TCO2: 28 MMOL/L (ref 23–27)
POC TCO2: 29 MMOL/L (ref 23–27)
POC TCO2: 34 MMOL/L (ref 23–27)
POC TCO2: 34 MMOL/L (ref 23–27)
POC TEMPERATURE: ABNORMAL
POTASSIUM BLD-SCNC: 3.8 MMOL/L (ref 3.5–5.1)
POTASSIUM SERPL-SCNC: 3.2 MMOL/L (ref 3.5–5.1)
POTASSIUM SERPL-SCNC: 3.5 MMOL/L (ref 3.5–5.1)
POTASSIUM SERPL-SCNC: 4 MMOL/L (ref 3.5–5.1)
PROT SERPL-MCNC: 6.2 G/DL (ref 6–8.4)
PROT SERPL-MCNC: 6.9 G/DL (ref 6–8.4)
PROT SERPL-MCNC: 6.9 G/DL (ref 6–8.4)
PROT UR QL STRIP: ABNORMAL
PROTHROMBIN TIME: 13.4 SEC (ref 10.6–14.8)
PROTHROMBIN TIME: 14 SEC (ref 10.6–14.8)
RBC # BLD AUTO: 5.29 M/UL (ref 4–5.4)
RBC # BLD AUTO: 5.38 M/UL (ref 4–5.4)
RBC # BLD AUTO: 5.38 M/UL (ref 4–5.4)
SAMPLE: ABNORMAL
SITE: ABNORMAL
SODIUM BLD-SCNC: 153 MMOL/L (ref 136–145)
SODIUM SERPL-SCNC: 147 MMOL/L (ref 136–145)
SODIUM SERPL-SCNC: 149 MMOL/L (ref 136–145)
SP GR UR STRIP: >1.03 (ref 1–1.03)
SP02: 99
TROPONIN I SERPL DL<=0.01 NG/ML-MCNC: 5.05 NG/ML
URN SPEC COLLECT METH UR: ABNORMAL
UROBILINOGEN UR STRIP-ACNC: NEGATIVE EU/DL
VANCOMYCIN TROUGH SERPL-MCNC: 36.5 UG/ML (ref 10–22)
VT: 380
WBC # BLD AUTO: 12.71 K/UL (ref 3.9–12.7)
WBC # BLD AUTO: 14.2 K/UL (ref 3.9–12.7)
WBC # BLD AUTO: 14.2 K/UL (ref 3.9–12.7)

## 2020-05-09 PROCEDURE — 87040 BLOOD CULTURE FOR BACTERIA: CPT | Mod: 59

## 2020-05-09 PROCEDURE — 85730 THROMBOPLASTIN TIME PARTIAL: CPT

## 2020-05-09 PROCEDURE — 99291 CRITICAL CARE FIRST HOUR: CPT | Mod: ,,, | Performed by: INTERNAL MEDICINE

## 2020-05-09 PROCEDURE — 87070 CULTURE OTHR SPECIMN AEROBIC: CPT

## 2020-05-09 PROCEDURE — 94761 N-INVAS EAR/PLS OXIMETRY MLT: CPT

## 2020-05-09 PROCEDURE — 82330 ASSAY OF CALCIUM: CPT

## 2020-05-09 PROCEDURE — 85610 PROTHROMBIN TIME: CPT | Mod: 91

## 2020-05-09 PROCEDURE — 82803 BLOOD GASES ANY COMBINATION: CPT

## 2020-05-09 PROCEDURE — 84100 ASSAY OF PHOSPHORUS: CPT

## 2020-05-09 PROCEDURE — 87077 CULTURE AEROBIC IDENTIFY: CPT

## 2020-05-09 PROCEDURE — 85025 COMPLETE CBC W/AUTO DIFF WBC: CPT

## 2020-05-09 PROCEDURE — 82977 ASSAY OF GGT: CPT

## 2020-05-09 PROCEDURE — 87086 URINE CULTURE/COLONY COUNT: CPT

## 2020-05-09 PROCEDURE — 83690 ASSAY OF LIPASE: CPT

## 2020-05-09 PROCEDURE — 82550 ASSAY OF CK (CPK): CPT

## 2020-05-09 PROCEDURE — C1894 INTRO/SHEATH, NON-LASER: HCPCS | Performed by: INTERNAL MEDICINE

## 2020-05-09 PROCEDURE — 83615 LACTATE (LD) (LDH) ENZYME: CPT

## 2020-05-09 PROCEDURE — 99900026 HC AIRWAY MAINTENANCE (STAT)

## 2020-05-09 PROCEDURE — 83735 ASSAY OF MAGNESIUM: CPT

## 2020-05-09 PROCEDURE — 84100 ASSAY OF PHOSPHORUS: CPT | Mod: 91

## 2020-05-09 PROCEDURE — 25000003 PHARM REV CODE 250: Performed by: INTERNAL MEDICINE

## 2020-05-09 PROCEDURE — 85027 COMPLETE CBC AUTOMATED: CPT

## 2020-05-09 PROCEDURE — 37799 UNLISTED PX VASCULAR SURGERY: CPT

## 2020-05-09 PROCEDURE — 94640 AIRWAY INHALATION TREATMENT: CPT

## 2020-05-09 PROCEDURE — S0028 INJECTION, FAMOTIDINE, 20 MG: HCPCS | Performed by: INTERNAL MEDICINE

## 2020-05-09 PROCEDURE — 85007 BL SMEAR W/DIFF WBC COUNT: CPT

## 2020-05-09 PROCEDURE — 83735 ASSAY OF MAGNESIUM: CPT | Mod: 91

## 2020-05-09 PROCEDURE — 85610 PROTHROMBIN TIME: CPT

## 2020-05-09 PROCEDURE — 99291 PR CRITICAL CARE, E/M 30-74 MINUTES: ICD-10-PCS | Mod: ,,, | Performed by: INTERNAL MEDICINE

## 2020-05-09 PROCEDURE — 84484 ASSAY OF TROPONIN QUANT: CPT

## 2020-05-09 PROCEDURE — 36415 COLL VENOUS BLD VENIPUNCTURE: CPT

## 2020-05-09 PROCEDURE — 80202 ASSAY OF VANCOMYCIN: CPT

## 2020-05-09 PROCEDURE — 25000003 PHARM REV CODE 250: Performed by: STUDENT IN AN ORGANIZED HEALTH CARE EDUCATION/TRAINING PROGRAM

## 2020-05-09 PROCEDURE — C1751 CATH, INF, PER/CENT/MIDLINE: HCPCS | Performed by: INTERNAL MEDICINE

## 2020-05-09 PROCEDURE — 84132 ASSAY OF SERUM POTASSIUM: CPT

## 2020-05-09 PROCEDURE — 87205 SMEAR GRAM STAIN: CPT

## 2020-05-09 PROCEDURE — 86901 BLOOD TYPING SEROLOGIC RH(D): CPT

## 2020-05-09 PROCEDURE — C1887 CATHETER, GUIDING: HCPCS | Performed by: INTERNAL MEDICINE

## 2020-05-09 PROCEDURE — 87186 SC STD MICRODIL/AGAR DIL: CPT

## 2020-05-09 PROCEDURE — 82962 GLUCOSE BLOOD TEST: CPT

## 2020-05-09 PROCEDURE — 80053 COMPREHEN METABOLIC PANEL: CPT | Mod: 91

## 2020-05-09 PROCEDURE — 83036 HEMOGLOBIN GLYCOSYLATED A1C: CPT

## 2020-05-09 PROCEDURE — 84295 ASSAY OF SERUM SODIUM: CPT

## 2020-05-09 PROCEDURE — 81003 URINALYSIS AUTO W/O SCOPE: CPT

## 2020-05-09 PROCEDURE — 63600175 PHARM REV CODE 636 W HCPCS: Mod: JG | Performed by: INTERNAL MEDICINE

## 2020-05-09 PROCEDURE — 25000003 PHARM REV CODE 250

## 2020-05-09 PROCEDURE — 93460 R&L HRT ART/VENTRICLE ANGIO: CPT | Performed by: INTERNAL MEDICINE

## 2020-05-09 PROCEDURE — 25500020 PHARM REV CODE 255: Performed by: INTERNAL MEDICINE

## 2020-05-09 PROCEDURE — 63600175 PHARM REV CODE 636 W HCPCS: Performed by: STUDENT IN AN ORGANIZED HEALTH CARE EDUCATION/TRAINING PROGRAM

## 2020-05-09 PROCEDURE — 94003 VENT MGMT INPAT SUBQ DAY: CPT

## 2020-05-09 PROCEDURE — 82553 CREATINE MB FRACTION: CPT

## 2020-05-09 PROCEDURE — 82150 ASSAY OF AMYLASE: CPT

## 2020-05-09 PROCEDURE — 95819 EEG AWAKE AND ASLEEP: CPT

## 2020-05-09 PROCEDURE — C9248 INJ, CLEVIDIPINE BUTYRATE: HCPCS

## 2020-05-09 PROCEDURE — 85014 HEMATOCRIT: CPT

## 2020-05-09 PROCEDURE — 82248 BILIRUBIN DIRECT: CPT

## 2020-05-09 PROCEDURE — 63600175 PHARM REV CODE 636 W HCPCS: Performed by: INTERNAL MEDICINE

## 2020-05-09 PROCEDURE — 93005 ELECTROCARDIOGRAM TRACING: CPT | Performed by: INTERNAL MEDICINE

## 2020-05-09 PROCEDURE — 85730 THROMBOPLASTIN TIME PARTIAL: CPT | Mod: 91

## 2020-05-09 PROCEDURE — C9248 INJ, CLEVIDIPINE BUTYRATE: HCPCS | Performed by: INTERNAL MEDICINE

## 2020-05-09 PROCEDURE — C1769 GUIDE WIRE: HCPCS | Performed by: INTERNAL MEDICINE

## 2020-05-09 PROCEDURE — 30000890 LABCORP MISCELLANEOUS TEST

## 2020-05-09 PROCEDURE — 80053 COMPREHEN METABOLIC PANEL: CPT

## 2020-05-09 PROCEDURE — 63600175 PHARM REV CODE 636 W HCPCS

## 2020-05-09 PROCEDURE — 27000221 HC OXYGEN, UP TO 24 HOURS

## 2020-05-09 PROCEDURE — 36600 WITHDRAWAL OF ARTERIAL BLOOD: CPT

## 2020-05-09 PROCEDURE — 99900035 HC TECH TIME PER 15 MIN (STAT)

## 2020-05-09 PROCEDURE — 25000242 PHARM REV CODE 250 ALT 637 W/ HCPCS: Performed by: INTERNAL MEDICINE

## 2020-05-09 PROCEDURE — 86316 IMMUNOASSAY TUMOR OTHER: CPT

## 2020-05-09 RX ORDER — METOPROLOL TARTRATE 1 MG/ML
INJECTION, SOLUTION INTRAVENOUS
Status: COMPLETED
Start: 2020-05-09 | End: 2020-05-09

## 2020-05-09 RX ORDER — METOPROLOL TARTRATE 1 MG/ML
5 INJECTION, SOLUTION INTRAVENOUS EVERY 4 HOURS PRN
Status: DISCONTINUED | OUTPATIENT
Start: 2020-05-09 | End: 2020-05-09 | Stop reason: HOSPADM

## 2020-05-09 RX ORDER — SODIUM CHLORIDE 9 MG/ML
INJECTION, SOLUTION INTRAVENOUS CONTINUOUS
Status: DISCONTINUED | OUTPATIENT
Start: 2020-05-09 | End: 2020-05-09 | Stop reason: HOSPADM

## 2020-05-09 RX ADMIN — IPRATROPIUM BROMIDE 0.5 MG: 0.5 SOLUTION RESPIRATORY (INHALATION) at 01:05

## 2020-05-09 RX ADMIN — PIPERACILLIN AND TAZOBACTAM 4.5 G: 4; .5 INJECTION, POWDER, FOR SOLUTION INTRAVENOUS at 03:05

## 2020-05-09 RX ADMIN — CHLORHEXIDINE GLUCONATE 15 ML: 1.2 RINSE ORAL at 08:05

## 2020-05-09 RX ADMIN — IPRATROPIUM BROMIDE 0.5 MG: 0.5 SOLUTION RESPIRATORY (INHALATION) at 07:05

## 2020-05-09 RX ADMIN — LEVALBUTEROL HYDROCHLORIDE 1.25 MG: 1.25 SOLUTION, CONCENTRATE RESPIRATORY (INHALATION) at 01:05

## 2020-05-09 RX ADMIN — CLEVIPIDINE 2 MG: 0.5 EMULSION INTRAVENOUS at 01:05

## 2020-05-09 RX ADMIN — CLEVIPIDINE 4 MG/HR: 0.5 EMULSION INTRAVENOUS at 07:05

## 2020-05-09 RX ADMIN — LEVALBUTEROL HYDROCHLORIDE 1.25 MG: 1.25 SOLUTION, CONCENTRATE RESPIRATORY (INHALATION) at 07:05

## 2020-05-09 RX ADMIN — METOPROLOL TARTRATE 5 MG: 5 INJECTION, SOLUTION INTRAVENOUS at 09:05

## 2020-05-09 RX ADMIN — DESMOPRESSIN ACETATE: 4 SOLUTION INTRAVENOUS at 05:05

## 2020-05-09 RX ADMIN — PIPERACILLIN AND TAZOBACTAM 4.5 G: 4; .5 INJECTION, POWDER, FOR SOLUTION INTRAVENOUS at 11:05

## 2020-05-09 RX ADMIN — FAMOTIDINE 20 MG: 10 INJECTION INTRAVENOUS at 08:05

## 2020-05-09 RX ADMIN — ENOXAPARIN SODIUM 40 MG: 100 INJECTION SUBCUTANEOUS at 08:05

## 2020-05-09 RX ADMIN — HYDROCORTISONE SODIUM SUCCINATE 100 MG: 100 INJECTION, POWDER, FOR SOLUTION INTRAMUSCULAR; INTRAVENOUS at 04:05

## 2020-05-09 RX ADMIN — POTASSIUM CHLORIDE 60 MEQ: 10 INJECTION, SOLUTION INTRAVENOUS at 08:05

## 2020-05-09 RX ADMIN — SODIUM CHLORIDE 425 ML: 0.45 INJECTION, SOLUTION INTRAVENOUS at 05:05

## 2020-05-09 RX ADMIN — DESMOPRESSIN ACETATE: 4 SOLUTION INTRAVENOUS at 01:05

## 2020-05-09 RX ADMIN — MUPIROCIN: 20 OINTMENT TOPICAL at 08:05

## 2020-05-09 RX ADMIN — DEXTROSE: 5 SOLUTION INTRAVENOUS at 01:05

## 2020-05-09 RX ADMIN — METOPROLOL TARTRATE 5 MG: 5 INJECTION, SOLUTION INTRAVENOUS at 01:05

## 2020-05-09 RX ADMIN — HYDROCORTISONE SODIUM SUCCINATE 100 MG: 100 INJECTION, POWDER, FOR SOLUTION INTRAMUSCULAR; INTRAVENOUS at 01:05

## 2020-05-09 RX ADMIN — METOPROLOL TARTRATE 5 MG: 5 INJECTION, SOLUTION INTRAVENOUS at 02:05

## 2020-05-09 RX ADMIN — DEXTROSE: 5 SOLUTION INTRAVENOUS at 09:05

## 2020-05-09 NOTE — NURSING
Pt care assumed. Pt lying in bed. Intubated. Settings verified. Cleveprex drip maintained. ST on tele. Right radial a-line leveled and zero'd, positional waveform. Pt does not follow commands. Does not withdraw to nailbed pressure x 4 extremeties. No extremity movements noted. Pupils non-reactive to light. No gag with in-line tracheal suctioning. Pt's spouse present at bedside. POC discussed. Questions answered.

## 2020-05-09 NOTE — PROGRESS NOTES
[facility]  Progress Note    Patient Name: Chelsea Solano  MRN: [id number]  Patient Class: IP- Inpatient   Admission Date: 2020  Length of Stay: 2 days  Attending Physician: Kaitlin Herrera DO  Primary Care Provider: Bertha Mcneil NP        Subjective:     Principal Problem:Cardiac arrest  Chief Complaint   Patient presents with    Cardiac Arrest       Interval history: intubated, not on sedation, normothermic. Unresponsive to pain, no pupillary response, no corneal reflex, no gag or cough reflex. EEG performed this morning c/w electrocerebral inactivity. Evaluated by Neurology and determined to meet brain death criteria. Evaluated by Pulm/cc and apnea test performed (positive testing based on pCO2 from 47-84 on ABG). Pt determined to be brain dead. Patient is an organ donor and JAYSON has met with family.      Objectives:     Vitals(Most Recent)      BP  Min: 86/54  Max: 232/116  Temp  Av.7 °F (36.5 °C)  Min: 96.3 °F (35.7 °C)  Max: 99.5 °F (37.5 °C)  Pulse  Av  Min: 78  Max: 145  Resp  Av.6  Min: 10  Max: 28  SpO2  Av %  Min: 73 %  Max: 100 %             Vitals([id number])  Temp:  [96.3 °F (35.7 °C)-99.5 °F (37.5 °C)]   Pulse:  []   Resp:  [10-28]   BP: ()/()   SpO2:  [73 %-100 %]   Arterial Line BP: ()/()       Vent Mode: A/C  Oxygen Concentration (%):  [] 50  Resp Rate Total:  [26 br/min-29 br/min] 26 br/min  Vt Set:  [380 mL] 380 mL  PEEP/CPAP:  [5 cmH20-8 cmH20] 5 cmH20  Pressure Support:  [0 cmH20] 0 cmH20  Mean Airway Pressure:  [13 udF99-25 cmH20] 14 cmH20      I & O([id number])    Intake/Output Summary (Last 24 hours) at 2020 1345  Last data filed at 2020 1300  Gross per 24 hour   Intake 4056 ml   Output 5455 ml   Net -1399 ml       Physical Exam:   General: NAD, intubated  HEENT: NCAT.  Pupils unequal left greater than right non reactive to light.     CV: RRR.No M/R/G.   Chest:  Intubated and  ventilated  Abd: +BS x 4. Soft. ND/NT.   Ext: . +distal pulses  Skin: Intact. No rash. No lesions.   Neuro:  Unresponsive to painful stimuli, pupils unreactive to light.  Negative corneal reflex, negative gag reflex, negative cough reflex. +apnea test    LABS  CBC  Recent Labs   Lab 05/07/20  0911 05/08/20 0400 05/09/20 0346 05/09/20  0347   WBC 10.25  --  10.69 14.20*  14.20*  --    RBC 4.65  --  4.98 5.38  5.38  --    HGB 14.2  --  15.1 16.3*  16.3*  --    HCT 47.8   < > 46.6 50.9*  50.9* 48     --  282 278  278  --    *  --  94 95  95  --    MCH 30.5  --  30.3 30.3  30.3  --    MCHC 29.7*  --  32.4 32.0  32.0  --     < > = values in this interval not displayed.       CMP  Recent Labs   Lab 05/08/20 2000 05/08/20 2345 05/09/20  0346   * 147* 149*  149*  149*   K 3.2* 3.5 3.2*  3.2*  3.2*   CO2 23 23 23  23  23   * 114* 116*  116*  116*   BUN 13 15 15  15  15   CREATININE 1.0 1.1 1.2  1.2  1.2   *  163* 140*  140* 176*  176*  176*  176*     Recent Labs   Lab 05/08/20 2000 05/08/20 2345 05/09/20  0346   CALCIUM 8.1* 8.1* 8.4*  8.4*  8.4*   MG 2.0 2.2 2.2   PHOS 3.2 6.3* 5.7*     Recent Labs   Lab 05/07/20  1230 05/08/20 0400 05/09/20  0346   PROT 7.0 6.2 6.9  6.9   ALBUMIN 3.7 3.3* 3.2*  3.2*   BILITOT 0.4 1.1* 1.0  1.0   * 44* 24  24   ALKPHOS 269* 198* 228*  228*   ALT 67* 50* 37  37         COAGS  Recent Labs   Lab 05/08/20  0400 05/09/20  0346   INR 1.1 1.1   APTT 30.1 29.9       CT HEAD WITHOUT CONTRAST [id number]    Axial CT of the brain without contrast using soft tissue and bone algorithm. Please note in the acute setting if there is a clinical concern for an acute stroke MRI would be more sensitive/specific for evaluation of ischemia.    CMS MANDATED QUALITY DATA - CT RADIATION - 436    All CT scans at this facility utilize dose modulation, iterative reconstruction, and/or weight based dosing when appropriate to reduce radiation  dose to as low as reasonably achievable.    COMPARISON:  None available.    FINDINGS:  Markedly abnormal appearing exam with diffuse loss of the normal gray-white differentiation throughout the cerebral and cerebellar hemispheres.  There is loss of the normal sulcation pattern throughout the cerebral and cerebellar hemispheres with effacement of the suprasellar and basilar cisterns as well as the foramen magnum.  No gross acute intracranial hemorrhage is identified (noting that the tentorium in sulci are bright compared to the adjacent edematous cerebral and cerebellar parenchyma), no midline shift or hydrocephalus is seen.  There are tiny air-fluid levels in both maxillary sinuses.  Scattered areas of mucosal thickening throughout the mid ethmoid air cells and maxillary sinuses, consider correlation for acute sinusitis.   Impression:       Markedly abnormal appearing exam as above and findings of global hypoxic ischemic injury (anoxic brain injury), with diffuse cerebral edema (with effacement of the saw slightly, suprasellar/basilar cisterns and foramen magnum).     5/7 Eeg: Impression:  This is an abnormal EEG due to diffuse background suppression   with no clear evidence of cortical activity.       Assessment/Plan:      Active Hospital Problems    Diagnosis  POA    *Cardiac arrest [I46.9]  Yes    Diabetes insipidus [E23.2]  Yes    Cerebral edema [G93.6]  Yes    Acute hypoxemic respiratory failure [J96.01]  Yes    Aspiration into lower respiratory tract [T17.800A]  Yes    Polypharmacy [Z79.899]  Not Applicable    Opiate overdose [T40.601A]  Yes    Benzodiazepine overdose [T42.4X1A]  Yes    Gastroesophageal reflux disease without esophagitis [K21.9]  Yes    Irritable bowel syndrome with diarrhea [K58.0]  Yes    Moderate episode of recurrent major depressive disorder [F33.1]  Yes    Diabetes [E11.9]  Yes    Anxiety [F41.9]  Yes    Mood disorder [F39]  Yes    Insomnia [G47.00]  Yes    Transaminitis  "[R74.0]  Yes    Hypertension [I10]  Yes      Resolved Hospital Problems   No resolved problems to display.     Cardiac Arrest   Acute respiratory failure  Cerebral edema and Hypoxic Ischemic Encephalopathy.   Brain Death  - etiology likely polypharmacy/aspiration leading to arrest  - CT head showing anoxic brain injury and cerebral edema   - Repeat EEG today,per neurology, electrocerebral inactivity  - positive apnea test based  - physical exam findings: unresponsive to noxious stimuli, no brainstem reflexes  - imaging, studies and physical exam consistent with brain death.   - pulm consulted: brain death determined  - neurology consulted, recs appreciated, "she fulfills brain death criteria set by the [facility]"  - discussed brain death with family.   - waiting on [facility] for next steps.         Code: dnr  Addendum:   Dispo: Patient declared brain dead. [facility] on site and arranged organ donation. Patient discharged to [facility]>       VTE Risk Mitigation (From admission, onward)         Ordered     enoxaparin injection 40 mg  Every 12 hours      05/07/20 1134     IP VTE HIGH RISK PATIENT  Once      05/07/20 1134     Place sequential compression device  Until discontinued      05/07/20 1134                Kaitlin Herrera DO  [facility]   [facility]    "

## 2020-05-09 NOTE — PLAN OF CARE
05/09/20 1001   Discharge Reassessment   Assessment Type Discharge Planning Assessment   Anticipated Discharge Disposition Still a [facility]   Discharge Plan A Other   Post-Acute Status   Post-Acute Authorization Other   Discharge Delays (!) Patient and Family Barriers     Received call from Dr. Herrera asking to assist in family situation. Call placed to patient's sister Harika but unable to reach. Met with Dirk Bal who produced paperwork including legal marriage license. License copied and placed in chart. Staff RN Jes made aware. Will inform Dr. Herrera.

## 2020-05-09 NOTE — NURSING
Notified Dr Guajardo of Heart rhythm change and elevated BP.  Dr Guajardo at bedside and new orders noted.

## 2020-05-09 NOTE — SIGNIFICANT EVENT
ADULT BRAIN DEATH DETERMINATION EXAMINATION     ------Examination Two------     Section 1. PREREQUISITES Clinical Evaluation:   A. Irreversible and Identifiable Cause of Coma: cardiac arrest, anoxic brain injury  B. Pre-requisite Criteria (all must be answered):  Pre-requisite Criteria Exam. One  Absence of severe hypotension (SBP greater than or equal to 100 mmHg) yes  Core body temperature greater than 95 degrees F (35 degrees C) yes  Absence of neuromuscular blockade yes  Absence of sedatives/CNS depressants yes  Absence of severe metabolic disturbances yes     Section 2. Physical Examination (all must be answered):   Exam. One  Spontaneous movement/posturing  no  Purposeful response to deep pain  NOTE: Spinally mediated reflexes are permissible no  Pupillary response to light no  Corneal reflex no  Oculocephalic reflex (Doll's Eyes) no  Gag reflex no  Cough reflex no    Apnea test was performed with the following done:  ABG drawn immediately before initiation of test  VS at beginning- , /83, SpO2 99%  Ventilator disconnected and passive oxygen via tubing 12L inserted via ET tube to level of ari  The patient was observed off the ventilator for a total of 10 minutes without any sign of respiratory effort.  There was desaturation to 70% but hemodynamics were without major change.   ABG was drawn at 8 min and again at 10 min, then the patient was reconnected to the ventilator.  Based on the ABG performed at 13:20, with rise in pCO2 from 47 to 84, pt meets criteria for positive apnea test.    Results for ROSAS LAST (MRN [id number]) as of 5/9/2020 13:33   Ref. Range 5/9/2020 13:11 5/9/2020 13:20   POC PH Latest Ref Range: 7.35 - 7.45  7.371 7.180 (LL)   POC PCO2 Latest Ref Range: 35 - 45 mmHg 47.2 (H) 83.7 (HH)   POC PO2 Latest Ref Range: 80 - 100 mmHg 84 50 (LL)   POC BE Latest Ref Range: -2 to 2 mmol/L 2 3   POC HCO3 Latest Ref Range: 24 - 28 mmol/L 27.4 31.3 (H)   POC SATURATED O2 Latest  Ref Range: 95 - 100 % 96 73 (L)   POC TCO2 Latest Ref Range: 23 - 27 mmol/L 29 (H) 34 (H)   FiO2 Unknown 50    Vt Unknown 380    PEEP Unknown 5    Flow Unknown  12   Sample Unknown ARTERIAL ARTERIAL   DelSys Unknown Adult Vent Nasal Can   Allens Test Unknown N/A N/A   Site Unknown Tamiko/UAC Tamiko/UAC   Mode Unknown AC/PRVC SPONT       Section 3. ANCILLARY STUDY  An Ancillary study   CT head on 5/7/20- Markedly abnormal appearing exam as above and findings of global hypoxic ischemic injury (anoxic brain injury), with diffuse cerebral edema (with effacement of the saw slightly, suprasellar/basilar cisterns and foramen magnum).  EEG 5/7/20- The background rhythm is diffusely suppressed. There is no clear   evidence of cortical activity despite lowering the sensitivity to   3 microvolts. EKG artifact and photic electrical artifact are   present.    Section 4. Signature of First Brain Death Determination Examination:   Licensed Physician's Name/Date/Time: Chelsea Delacruz. On 5/9/2020 at 1:26 PM.

## 2020-05-09 NOTE — PLAN OF CARE
05/09/20 0719   Patient Assessment/Suction   Level of Consciousness (AVPU) unresponsive   All Lung Fields Breath Sounds coarse   Suction Method tracheal   $ Suction Charges Inline Suction Procedure Stat Charge   Secretions Amount moderate   Secretions Color yellow   Secretions Characteristics thick   PRE-TX-O2   O2 Device (Oxygen Therapy) ventilator   Oxygen Concentration (%) 50   SpO2 100 %   Pulse Oximetry Type Continuous   $ Pulse Oximetry - Multiple Charge Pulse Oximetry - Multiple   Pulse (!) 133   Resp (!) 26        Airway - Non-Surgical 05/07/20 Endotracheal Tube   Placement Date: 05/07/20   Inserted by: EMS  Airway Device: Endotracheal Tube  Airway Device Size: 7.0  Depth of Insertion (cm): 23  Secured at: Teeth  Breath Sounds: Equal Bilateral   Secured at 24 cm   Measured At Lips   Secured Location Center   Secured by Commercial tube barfield   Bite Block center   Ready to Wean/Extubation Screen   FIO2<=50 (chart decimal) 0.5

## 2020-05-09 NOTE — PLAN OF CARE
Significant other at bedside provided with education regarding plan of care and pt status. He Verbalized understanding of education provided at this time.

## 2020-05-09 NOTE — PLAN OF CARE
05/08/20 1858   Patient Assessment/Suction   Level of Consciousness (AVPU) unresponsive   Respiratory Effort Normal;Unlabored   Expansion/Accessory Muscles/Retractions expansion symmetric;no retractions;no use of accessory muscles   All Lung Fields Breath Sounds coarse;diminished   $ Suction Charges Inline Suction Procedure Stat Charge   Secretions Amount small   Secretions Color yellow;white   Secretions Characteristics thick   PRE-TX-O2   O2 Device (Oxygen Therapy) ventilator   Oxygen Concentration (%) 50   SpO2 100 %   Pulse Oximetry Type Continuous   $ Pulse Oximetry - Multiple Charge Pulse Oximetry - Multiple   Pulse 81   Resp (!) 26   Aerosol Therapy   $ Aerosol Therapy Charges Aerosol Treatment   Daily Review of Necessity (SVN) completed   Respiratory Treatment Status (SVN) given   Treatment Route (SVN) in-line;ventilator   Patient Position (SVN) semi-Arredondo's   Post Treatment Assessment (SVN) breath sounds unchanged   Signs of Intolerance (SVN) none   Breath Sounds Post-Respiratory Treatment   Post-treatment Heart Rate (beats/min) 81   Post-treatment Resp Rate (breaths/min) 18        Airway - Non-Surgical 05/07/20 Endotracheal Tube   Placement Date: 05/07/20   Inserted by: EMS  Airway Device: Endotracheal Tube  Airway Device Size: 7.0  Depth of Insertion (cm): 23  Secured at: Teeth  Breath Sounds: Equal Bilateral   Secured at 22 cm   Measured At Lips   Secured Location Left   Secured by Commercial tube barfield   Status Intact;Secured;Patent   Cuff Pressure   (mlt)   Vent Select   Conventional Vent Y   Charged w/in last 24h YES   Preset Conventional Ventilator Settings   Vent ID [id number]   Vent Type    Ventilation Type VC   Vent Mode A/C   Humidity HME   Set Rate 26 BPM   Vt Set 380 mL   PEEP/CPAP 8 cmH20   Pressure Support 0 cmH20   Waveform RAMP   Peak Flow 55 L/min   Plateau Set/Insp. Hold (sec) 0   Trigger Sensitivity Flow/I-Trigger 2.7 L/min   Patient Ventilator Parameters   Resp Rate Total 26 br/min    Peak Airway Pressure 33 cmH2O   Mean Airway Pressure 16 cmH20   Plateau Pressure 23 cmH20   Exhaled Vt 423 mL   Total Ve 11 mL   I:E Ratio Measured 1:2.00   Tubing ID (mm) 7.5 mm   Tube Type ET   Conventional Ventilator Alarms   Alarms On Y   Resp Rate High Alarm 50 br/min   Press High Alarm 55 cmH2O   Apnea Rate 10   Apnea Volume (mL) 0 mL   Apnea Oxygen Concentration  100   Apnea Flow Rate (L/min) 44   T Apnea 20 sec(s)   Ready to Wean/Extubation Screen   FIO2<=50 (chart decimal) 0.5   MV<16L (chart vol.) 11   PEEP <=8 (chart #) 8   Ready to Wean Parameters   F/VT Ratio<105 (RSBI) (!) 61.47   Respiratory Evaluation   $ Care Plan Tech Time 15 min   Evaluation For New Orders

## 2020-05-09 NOTE — LOPA/MORA/SWTA/AOC/AEB
Spoke with family upon brain death declaration. Family will honor patient's decision to be an organ, tissue and eye donor. Memorandum of conversation completed at 1417 on 5/9/2020. Copy placed in paper chart.  Thank you,  Belem LOOMIS    [phone]

## 2020-05-09 NOTE — PROGRESS NOTES
VANCOMYCIN PHARMACOKINETIC NOTE:  Vancomycin Day #3    Objective:    44 y.o., female, Actual Body Weight = 106.3 kg (234 lb 5.6 oz)    Diagnosis/Indication for Vancomycin:  Pneumonia   Desired Vancomycin Peak:  30-35 mcg/ml; Desired Trough: 10-15 mcg/ml    Current Vancomycin Regimen:  1750 IV every 12 hours      The patient has the following labs:     5/9/2020 Estimated Creatinine Clearance: 76.8 mL/min (based on SCr of 1.1 mg/dL). Lab Results   Component Value Date    BUN 15 05/08/2020       Lab Results   Component Value Date    WBC 10.69 05/08/2020            Vancomycin Trough:  36.5 mcg/mL collected 12 hours after Dose #3      Assessment:    Renal function is: worsening    Vancomycin trough is above goal range.    Plan:    Will change vancomycin to 1750 mg IV every 30 hours.      Will schedule next dose for 5/9 at 18:00    Will obtain vancomycin trough before next dose at 17:00, 5/9/20    Pharmacy will continue to manage vancomycin therapy and adjust regimen as necessary.    Thank you for allowing us to participate in this patient's care.     Talib Krishnamurthy 5/9/2020 3:15 AM  Department of Pharmacy  Ext [phone]

## 2020-05-09 NOTE — PROGRESS NOTES
[facility]  Pulmonology  Progress Note    Subjective     05/08/2022:  No acute issues overnight.  Remains completely unresponsive off sedation.  Still massive targeted temperature management.  05/09/2020- no acute issues. Now normothermic       Review of Systems   Unable to perform ROS: Intubated      I have personally reviewed the following during today's evaluation:  past medical history, ROS, family history, social history, surgical history, current inpatient medications,drug allergies, vital signs over the past 24 hours, results of relevant diagnostic studies and nursing/provider documentation from the past 24 hours.     Objective     VS Temp:  [96.3 °F (35.7 °C)-99.5 °F (37.5 °C)]   Pulse:  []   Resp:  [23-28]   BP: ()/()   SpO2:  [95 %-100 %]   Arterial Line BP: ()/()   Ideal body weight: 54.7 kg (120 lb 9.5 oz)  Adjusted ideal body weight: 75.3 kg (166 lb 1.5 oz)   I/O   Intake/Output Summary (Last 24 hours) at 5/9/2020 0851  Last data filed at 5/9/2020 0600  Gross per 24 hour   Intake 4131 ml   Output 5430 ml   Net -1299 ml        Vent SpO2 100%   Vent Mode: A/C  Oxygen Concentration (%):  [40-50] 50  Resp Rate Total:  [26 br/min-28 br/min] 26 br/min  Vt Set:  [380 mL] 380 mL  PEEP/CPAP:  [5 cmH20-8 cmH20] 8 cmH20  Pressure Support:  [0 cmH20] 0 cmH20  Mean Airway Pressure:  [14 tdT14-72 cmH20] 16 cmH20     PE Physical Exam   Constitutional: She appears well-developed and well-nourished. She is intubated. She is obese.   HENT:   Head: Normocephalic.   Right Ear: External ear normal.   Left Ear: External ear normal.   Nose: Nose normal.   Mouth/Throat: Mallampati Score: unable to assess.   Right pupils 3 mm and not reactive to light.  Left pupil is 5 mm and not reactive to light.  Negative doll's eye reflex  Corneal reflex absent   Neck: No JVD present. No tracheal deviation present. No thyromegaly present.   Cardiovascular: Normal rate, regular rhythm, normal  heart sounds and intact distal pulses. Exam reveals no gallop and no friction rub.   No murmur heard.  Pulmonary/Chest: Normal expansion and symmetric chest wall expansion. She is intubated. She has no wheezes. She has rhonchi. She has no rales.   Abdominal: Soft. Bowel sounds are normal. She exhibits no distension. There is no tenderness.   Genitourinary:   Genitourinary Comments: Turpin catheter   Musculoskeletal: Normal range of motion. She exhibits no edema or deformity.   Lymphadenopathy: No supraclavicular adenopathy is present.     She has no cervical adenopathy.     She has no axillary adenopathy.   Neurological: She is unresponsive. She exhibits abnormal muscle tone. GCS eye subscore is 1. GCS verbal subscore is 1. GCS motor subscore is 1.   Absent gag, corneal, afferent pupillary light, and cough reflexes.  No response to noxious stimuli.  Not initiating spontaneous breaths.   Skin: Skin is warm and dry. No rash noted.   Nursing note and vitals reviewed.        Labs I have personally reviewed and interpreted all labs / diagnostic studies obtained over the past 24 hours, and relevant results are as follows:  Recent Labs   Lab 05/09/20  0346 05/09/20  0347   WBC 14.20*  14.20*  --    RBC 5.38  5.38  --    HGB 16.3*  16.3*  --    HCT 50.9*  50.9* 48     278  --    MCV 95  95  --    MCH 30.3  30.3  --    MCHC 32.0  32.0  --    *  149*  149*  --    K 3.2*  3.2*  3.2*  --    *  116*  116*  --    CO2 23  23  23  --    BUN 15  15  15  --    CREATININE 1.2  1.2  1.2  --    MG 2.2  --    ALT 37  37  --    AST 24  24  --    ALKPHOS 228*  228*  --    BILITOT 1.0  1.0  --    PROT 6.9  6.9  --    ALBUMIN 3.2*  3.2*  --    PH  --  7.385   PCO2  --  44.4   PO2  --  92   HCO3  --  26.6   POCSATURATED  --  97   BE  --  2   INR 1.1  --    APTT 29.9  --      Troponin: <0.030   Imaging I have personally reviewed and interpreted the following images and reviewed the associated  Radiology report.  CXR: I have reviewed all pertinent results/findings within the past 24 hours and my personal findings are:  1. Lines and tubes are stable. Nasogastric tube loops in the stomach  TTE:  · Concentric left ventricular remodeling.  · Left ventricular systolic function. The estimated ejection fraction is 60%.  · Grade I (mild) left ventricular diastolic dysfunction consistent with impaired relaxation.  · Mild right ventricular enlargement.  · Normal right ventricular systolic function.  · Mild tricuspid regurgitation     Micro I have personally reviewed and interpreted the available culture data.  Relevant results are as follows.  Blood Culture   Lab Results   Component Value Date    LABBLOO No Growth to date 05/07/2020    LABBLOO No Growth to date 05/07/2020   , Sputum Culture   Lab Results   Component Value Date    GSRESP Moderate WBC's 05/07/2020    GSRESP >10 epithelial cells per low power field 05/07/2020    GSRESP Few Gram positive cocci 05/07/2020    GSRESP Few yeast 05/07/2020    GSRESP Few Gram positive rods 05/07/2020    GSRESP Rare Gram negative rods 05/07/2020    RESPIRATORYC Normal respiratory nilam 05/07/2020    and Urine Culture    Lab Results   Component Value Date    LABURIN No growth to date 05/07/2020      Medications Scheduled    chlorhexidine  15 mL Mouth/Throat BID    enoxparin  40 mg Subcutaneous Q12H    famotidine (PF)  20 mg Intravenous BID    hydrocortisone sodium succinate  100 mg Intravenous Q8H    ipratropium  0.5 mg Nebulization Q6H    levalbuterol  1.25 mg Nebulization Q6H    mupirocin   Nasal BID    piperacillin-tazobactam (ZOSYN) IVPB  4.5 g Intravenous Q8H    sodium chloride 0.9% 50 mL with desmopressin (DDAVP) 1 mcg   Intravenous Q12H    vancomycin (VANCOCIN) IVPB  1,750 mg Intravenous Q30H      Continuous Infusions:    clevidipine 4 mg/hr (05/09/20 0713)    dextrose 5 % 100 mL/hr at 05/09/20 0142    norepinephrine bitartrate-D5W Stopped (05/09/20 0101)       PRN   calcium gluconate IVPB, calcium gluconate IVPB, calcium gluconate IVPB, calcium gluconate IVPB, calcium gluconate IVPB, calcium gluconate IVPB, dextrose 50%, dextrose 50%, glucagon (human recombinant), glucose, glucose, insulin aspart U-100, lorazepam, magnesium sulfate IVPB, magnesium sulfate IVPB, magnesium sulfate IVPB, magnesium sulfate IVPB, magnesium sulfate IVPB, metoprolol, potassium chloride in water **AND** potassium chloride in water, potassium chloride in water, potassium chloride in water, potassium chloride in water, sodium chloride, sodium chloride 0.9%, sodium phosphate IVPB, sodium phosphate IVPB, sodium phosphate IVPB, sodium phosphate IVPB, sodium phosphate IVPB, sodium phosphate IVPB, Pharmacy to dose Vancomycin consult **AND** vancomycin - pharmacy to dose        Assessment       Active Hospital Problems    Diagnosis    *Cardiac arrest    Diabetes insipidus    Cerebral edema    Acute hypoxemic respiratory failure    Aspiration into lower respiratory tract    Polypharmacy    Opiate overdose    Benzodiazepine overdose    Gastroesophageal reflux disease without esophagitis    Irritable bowel syndrome with diarrhea    Moderate episode of recurrent major depressive disorder    Diabetes    Anxiety    Mood disorder    Insomnia    Transaminitis    Hypertension      My Impression:  Status post hypoxic cardiopulmonary arrest with apparent devastating anoxic brain injury.  Brain death- see separate note for details regarding brain death exam & apnea test  Possible drug overdose  Sinus tachycardia  Possible aspiration pneumonia, with thick secretions  Suspect diabetes insipidus    Plan     - brain death pronounced today  - turn over management to EBONIE  - d/w hospitalist  - d/w EBONIE nurse    The following were evaluated and adjusted as needed: mechanical ventilator settings and weaning status, support tubes and access lines and invasive monitoring and acid base balance and  oxygenation needs       Critical Care  - THE PATIENT HAS A HIGH PROBABILITY OF IMMINENT OR LIFE THREATENING DETERIORATION.  Over 50%time of encounter was in direct care at bedside.  Time was 30 to 74 minutes required for patient care.  Time needed for all of the above totaled 30 minutes.    Chelsea Delacruz MD  Pulmonary & Critical Care Medicine

## 2020-05-09 NOTE — PROCEDURES
EEG Report     Patient name: Chelsea Solano       73     Date of Service: 20     Duration: 30 minutes     Requested By: TERRI Aguero Physician: Jesse Casper M.D.         Reason for Study: Encephalopathy.            Clinical History: 43yo woman who presented in out of hospital cardiac arrest.        AED/sedation: none  Temperature: 98.6F  BP: 93/76     Exam Notes:  The recording was performed with the standard 10-20 electrode placement with additional ECG electrodes.      Summary:     There is diffuse suppression of the background activity. There is no evidence of cortical activity despite lowering the sensitivity 2 microvolts. EKG artifact and electrical artifact are present.     Photic stimulation is performed with no abnormal reactivity noted. Hyperventilation is not performed.     No epileptiform discharges or seizures are captured.        Impression:  This is an abnormal EEG due to the lack of evidence of cortical activity consistent with electrocerebral inactivity.

## 2020-05-09 NOTE — PROGRESS NOTES
[facility]  Neurology  Consult Note    Patient Name: Chelsea Solano  MRN: [id number]  Admission Date: 5/7/2020  Hospital Length of Stay: 2 days  Code Status: Full Code   Attending Provider: Dr Herrera  Consulting Provider: Dr Casper  Primary Care Physician: Bertha Mcneil NP  Principal Problem:Cardiac arrest      Subjective:     Chief Complaint:  Cardiac arrest    HPI from EMR: 44-year-old female with past medical history of diabetes, hypertension and multiple psychiatric diagnosis including ADHD, bipolar, major depressive disorder brought in by EMS following cardiac arrest in field.  History obtained from patient's  and ED physician.  Per  patient was in usual state of health and last known normal around 530-600 a.m. this morning.  They returned to sleep and  woke up at around 7:15 a.m. and noticed that patient was unresponsive, pulseless and covered in vomit.  He began CPR and notified EMS. On EMS arrival, ACLS continued and patient received epi x 4 and Narcan with ROSC achieved after about 20 min..  Patient was intubated in the field.    In ED,  re-intubated 2/2 issues ventilating patient. CXR right upper lobe infection vs atelectasis, CT head obtained showing global hypoxic ischemic injury and diffuse cerebral edema.  Utox + opiates and benzos.   Per , patient took her prescribed nighttime medications.  He initially told ER physician that patient had voiced suicidal ideation about 1 week ago but when discussed again he denied.     Neurological Consult: Pt seen and examined and POC discussed with Dr. Casper. Neuro consulted for brain death evaluation. Pt nonresponsive, intubated, and without sedation.  Her temp was greater than 36C.  She was not on vasopressors.  came to bedside.  5/9: Pt was re-examined. No change in neurological status.  She has been tachycardic and hypertensive over night.EEF with brain death criteria this morning.  José Luis at  bedside.  Past Medical History:   Diagnosis Date    ADHD (attention deficit hyperactivity disorder)     Anxiety     Bipolar 2 disorder     Depression     Diabetes mellitus     Hypertension 3/16/2013    Kidney stones March 2014    Migraine headache     TIA (transient ischemic attack)     UTI (urinary tract infection) March 2014       Past Surgical History:   Procedure Laterality Date    CHOLECYSTECTOMY      HYSTERECTOMY      INSERTION OF IMPLANTABLE LOOP RECORDER      KNEE SURGERY  2003    WRIST SURGERY  2001       Review of patient's allergies indicates:   Allergen Reactions    Toradol [ketorolac] Hives    Nsaids (non-steroidal anti-inflammatory drug)     Ondansetron Hives    Stadol [butorphanol tartrate] Other (See Comments)     hallucinations       Current Neurological Medications: none    No current facility-administered medications on file prior to encounter.      Current Outpatient Medications on File Prior to Encounter   Medication Sig    acetaminophen (TYLENOL) 325 MG tablet Take 325 mg by mouth every 6 (six) hours as needed for Pain.    albuterol (PROVENTIL/VENTOLIN HFA) 90 mcg/actuation inhaler Inhale 2 puffs into the lungs as needed.    albuterol-ipratropium (DUO-NEB) 2.5 mg-0.5 mg/3 mL nebulizer solution Take 3 mLs by nebulization every 6 (six) hours as needed for Wheezing. Rescue    clonazePAM (KLONOPIN) 2 MG Tab Take 2 mg by mouth 3 (three) times daily as needed.    cyclobenzaprine (FLEXERIL) 10 MG tablet Take 10 mg by mouth 3 (three) times daily as needed for Muscle spasms.    escitalopram oxalate (LEXAPRO) 10 MG tablet Take 1 tablet (10 mg total) by mouth once daily.    ibuprofen (ADVIL,MOTRIN) 200 MG tablet Take 200 mg by mouth every 6 (six) hours as needed for Pain.    lidocaine (LIDODERM) 5 % Place 1 patch onto the skin every 24 hours. Remove & Discard patch within 12 hours or as directed by MD    loperamide (IMODIUM A-D) 2 mg Tab Take 2 mg by mouth 2 (two) times daily.     melatonin (MELATIN) Take 5 mg by mouth daily as needed for Insomnia.    methocarbamol (ROBAXIN) 500 MG Tab Take 500 mg by mouth 3 (three) times daily.    metoprolol tartrate (LOPRESSOR) 50 MG tablet Take 1 tablet (50 mg total) by mouth 2 (two) times daily.    pantoprazole (PROTONIX) 20 MG tablet Take 1 tablet (20 mg total) by mouth once daily.    pramipexole 1.5 mg Tb24 Take 1.5 mg by mouth every evening.    quetiapine (SEROQUEL) 400 MG tablet Take 400 mg by mouth once daily at 6am.    zolpidem (AMBIEN) 10 mg Tab Take 10 mg by mouth daily as needed.    dextroamphetamine-amphetamine 30 mg Tab Take 1 tablet by mouth daily as needed.      Family History     Problem Relation (Age of Onset)    Cancer Father, Maternal Grandmother    Diabetes Father    Heart disease Mother, Father    Hypertension Mother    Schizophrenia Mother        Tobacco Use    Smoking status: Former Smoker     Packs/day: 1.00     Years: 20.00     Pack years: 20.00     Last attempt to quit: 3/11/2012     Years since quittin.1    Smokeless tobacco: Never Used   Substance and Sexual Activity    Alcohol use: Yes     Alcohol/week: 0.0 standard drinks    Drug use: Yes     Types: Hydrocodone    Sexual activity: Yes     Partners: Male     Birth control/protection: None     Review of Systems   Unable to perform ROS: Acuity of condition     Objective:     Vital Signs (Most Recent):  Temp: 98.6 °F (37 °C) (20)  Pulse: (!) 132 (20)  Resp: (!) 26 (20)  BP: (!) 155/100 (20)  SpO2: 100 % (20) Vital Signs (24h Range):  Temp:  [96.3 °F (35.7 °C)-99.5 °F (37.5 °C)] 98.6 °F (37 °C)  Pulse:  [] 132  Resp:  [23-28] 26  SpO2:  [95 %-100 %] 100 %  BP: ()/() 155/100  Arterial Line BP: ()/() 170/94     Weight: 106.3 kg (234 lb 5.6 oz)  Body mass index is 40.23 kg/m².    Physical Exam   Constitutional: She appears well-developed and well-nourished.   HENT:   Head:  Normocephalic and atraumatic.   Cardiovascular: Normal rate.   Pulmonary/Chest:   intubated   Musculoskeletal:   Passive ROM only   Neurological: GCS eye subscore is 1. GCS verbal subscore is 1. GCS motor subscore is 1.   nonresponsive   Skin: Skin is warm and dry.   Nursing note and vitals reviewed.      NEUROLOGICAL EXAMINATION:     MENTAL STATUS   Level of consciousness: unresponsive to painful stimuli    CRANIAL NERVES     CN III, IV, VI   Right pupil: Size: 4 mm. Reactivity: non-reactive.   Left pupil: Size: 5 mm. Reactivity: non-reactive.        Oculocephalic reflex negative  Cornea reflex negative  Gag reflex negative  Cough reflex negative  Oculovestibular reflex negative  No pupillary response     MOTOR EXAM        Limited assessment     SENSORY EXAM        nonresponsive to noxious stimuli  No response to supraorbital pinch     GAIT AND COORDINATION        JENNIFER       Significant Labs  Lab Results   Component Value Date    WBC 10.69 05/08/2020    HGB 15.1 05/08/2020    HCT 46.6 05/08/2020    MCV 94 05/08/2020     05/08/2020     CMP  Sodium   Date Value Ref Range Status   05/09/2020 149 (H) 136 - 145 mmol/L Final   05/09/2020 149 (H) 136 - 145 mmol/L Final   05/09/2020 149 (H) 136 - 145 mmol/L Final     Potassium   Date Value Ref Range Status   05/09/2020 3.2 (L) 3.5 - 5.1 mmol/L Final   05/09/2020 3.2 (L) 3.5 - 5.1 mmol/L Final   05/09/2020 3.2 (L) 3.5 - 5.1 mmol/L Final     Chloride   Date Value Ref Range Status   05/09/2020 116 (H) 95 - 110 mmol/L Final   05/09/2020 116 (H) 95 - 110 mmol/L Final   05/09/2020 116 (H) 95 - 110 mmol/L Final     CO2   Date Value Ref Range Status   05/09/2020 23 23 - 29 mmol/L Final   05/09/2020 23 23 - 29 mmol/L Final   05/09/2020 23 23 - 29 mmol/L Final     Glucose   Date Value Ref Range Status   05/09/2020 176 (H) 70 - 110 mg/dL Final   05/09/2020 176 (H) 70 - 110 mg/dL Final   05/09/2020 176 (H) 70 - 110 mg/dL Final   05/09/2020 176 (H) 70 - 110 mg/dL Final     BUN,  Bld   Date Value Ref Range Status   05/09/2020 15 6 - 20 mg/dL Final   05/09/2020 15 6 - 20 mg/dL Final   05/09/2020 15 6 - 20 mg/dL Final     Creatinine   Date Value Ref Range Status   05/09/2020 1.2 0.5 - 1.4 mg/dL Final   05/09/2020 1.2 0.5 - 1.4 mg/dL Final   05/09/2020 1.2 0.5 - 1.4 mg/dL Final   03/19/2013 0.7 0.5 - 1.4 mg/dL Final     Calcium   Date Value Ref Range Status   05/09/2020 8.4 (L) 8.7 - 10.5 mg/dL Final   05/09/2020 8.4 (L) 8.7 - 10.5 mg/dL Final   05/09/2020 8.4 (L) 8.7 - 10.5 mg/dL Final   03/19/2013 9.2 8.7 - 10.5 mg/dL Final     Total Protein   Date Value Ref Range Status   05/09/2020 6.9 6.0 - 8.4 g/dL Final   05/09/2020 6.9 6.0 - 8.4 g/dL Final     Albumin   Date Value Ref Range Status   05/09/2020 3.2 (L) 3.5 - 5.2 g/dL Final   05/09/2020 3.2 (L) 3.5 - 5.2 g/dL Final     Total Bilirubin   Date Value Ref Range Status   05/09/2020 1.0 0.1 - 1.0 mg/dL Final     Comment:     For infants and newborns, interpretation of results should be based  on gestational age, weight and in agreement with clinical  observations.  Premature Infant recommended reference ranges:  Up to 24 hours.............<8.0 mg/dL  Up to 48 hours............<12.0 mg/dL  3-5 days..................<15.0 mg/dL  6-29 days.................<15.0 mg/dL     05/09/2020 1.0 0.1 - 1.0 mg/dL Final     Comment:     For infants and newborns, interpretation of results should be based  on gestational age, weight and in agreement with clinical  observations.  Premature Infant recommended reference ranges:  Up to 24 hours.............<8.0 mg/dL  Up to 48 hours............<12.0 mg/dL  3-5 days..................<15.0 mg/dL  6-29 days.................<15.0 mg/dL       Alkaline Phosphatase   Date Value Ref Range Status   05/09/2020 228 (H) 55 - 135 U/L Final   05/09/2020 228 (H) 55 - 135 U/L Final   03/19/2013 221 (H) 55 - 135 U/L Final     AST   Date Value Ref Range Status   05/09/2020 24 10 - 40 U/L Final   05/09/2020 24 10 - 40 U/L Final    03/19/2013 39 10 - 40 U/L Final     ALT   Date Value Ref Range Status   05/09/2020 37 10 - 44 U/L Final   05/09/2020 37 10 - 44 U/L Final     Anion Gap   Date Value Ref Range Status   05/09/2020 10 8 - 16 mmol/L Final   05/09/2020 10 8 - 16 mmol/L Final   05/09/2020 10 8 - 16 mmol/L Final   03/19/2013 10 5 - 15 meq/L Final     eGFR if    Date Value Ref Range Status   05/09/2020 >60.0 >60 mL/min/1.73 m^2 Final   05/09/2020 >60.0 >60 mL/min/1.73 m^2 Final   05/09/2020 >60.0 >60 mL/min/1.73 m^2 Final     eGFR if non    Date Value Ref Range Status   05/09/2020 54.7 (A) >60 mL/min/1.73 m^2 Final     Comment:     Calculation used to obtain the estimated glomerular filtration  rate (eGFR) is the CKD-EPI equation.      05/09/2020 54.7 (A) >60 mL/min/1.73 m^2 Final     Comment:     Calculation used to obtain the estimated glomerular filtration  rate (eGFR) is the CKD-EPI equation.      05/09/2020 54.7 (A) >60 mL/min/1.73 m^2 Final     Comment:     Calculation used to obtain the estimated glomerular filtration  rate (eGFR) is the CKD-EPI equation.          Significant Imaging:   X-Ray Chest AP Portable  PROCEDURE:   XR CHEST AP PORTABLE  dated  5/8/2020 4:25 AM    CLINICAL HISTORY:   Female 44 years of age.   [place]    TECHNIQUE: AP view of the chest obtained portably at 4:25 AM.    PREVIOUS STUDIES:  May 7, 2020    FINDINGS:    Endotracheal tube terminates 1 cm above the ari. NG tube loops in  the stomach and terminates at the gastric cardia. Right IJ central  vascular catheter is in the lower SVC. Cardiac and mediastinal  contours are stable. A cardiac loop recorder is present. Medial right  worse than left upper lobe streaky infiltrates are stable. There is no  pleural effusion or pneumothorax.    IMPRESSION:    1. Lines and tubes are stable. Nasogastric tube loops in the stomach  and terminates at the gastric cardia.  2. Bilateral upper lobe infiltrates are unchanged.    Electronically  Signed by Nadia Prajapati on 2020 7:11 AM    CT brain:  Markedly abnormal appearing exam as above and findings of global hypoxic ischemic injury (anoxic brain injury), with diffuse cerebral edema (with effacement of the saw slightly, suprasellar/basilar cisterns and foramen magnum).    EE/8    This is an abnormal EEG due to diffuse background suppression   with no clear evidence of cortical activity.     EE/9:  No electrical activity on EEG  Assessment and Plan:  43 y/o female s/p cardiac arrest    Hypoxic Ischemic Encephalopathy  Cerebral Edema  -EEG noted above: no signs of cortical activity  -CT head: global hypoxic ischemic injury  -Repeat EE/9 AM:    Brain Death Evaluation:  Pt is not on sedation or vasopressors.  She is above 36C. SBP greater than 100.  -REFLEXES absent:  Pupillary response absent BL  Oculocephalic reflex absent BL  Oculovestibular reflex absent BL  Cornea reflex absent BL  Gag reflex absent  Cough reflex absent  Supraorbital pinch negative    -EEG : no electrical activity, as reviewed by Dr. Casper  -CT noted above        Cardiac Arrest  -ROSC in field after 20 minutes      Dr Casper discussed findings and prognosis with patient's family members: , ex , sister, parents, and RN Hillary and myself present.            Active Diagnoses:    Diagnosis Date Noted POA    PRINCIPAL PROBLEM:  Cardiac arrest [I46.9] 2020 Yes    Diabetes insipidus [E23.2] 2020 Yes    Cerebral edema [G93.6] 2020 Yes    Acute hypoxemic respiratory failure [J96.01] 2020 Yes    Aspiration into lower respiratory tract [T17.800A] 2020 Yes    Polypharmacy [Z79.899] 2020 Not Applicable    Opiate overdose [T40.601A] 2020 Yes    Benzodiazepine overdose [T42.4X1A] 2020 Yes    Gastroesophageal reflux disease without esophagitis [K21.9] 2020 Yes    Irritable bowel syndrome with diarrhea [K58.0] 2020 Yes    Moderate episode of recurrent  major depressive disorder [F33.1] 02/24/2020 Yes    Diabetes [E11.9] 03/16/2014 Yes    Anxiety [F41.9] 01/07/2014 Yes    Mood disorder [F39] 01/07/2014 Yes    Insomnia [G47.00] 03/25/2013 Yes    Transaminitis [R74.0] 03/25/2013 Yes    Hypertension [I10] 03/16/2013 Yes      Problems Resolved During this Admission:       VTE Risk Mitigation (From admission, onward)         Ordered     enoxaparin injection 40 mg  Every 12 hours      05/07/20 1134     IP VTE HIGH RISK PATIENT  Once      05/07/20 1134     Place sequential compression device  Until discontinued      05/07/20 1134                Thank you for your consult.   Willa Washington NP  Neurology  [facility]    I have seen the patient, reviewed the Nurse Practitioner's progress note. I have personally interviewed and examined the patient at bedside and agree with the findings, with the following additions and modifications:    Patient was examined at 10:19 am on 5/9/20.  On my exam this morning, she was intubated, not on sedation, normothermic with SBP > 100:  No verbal output.  No eye opening.  No response to noxious stimulation(sternal rub, supraorbital pressure, TMJ pressure, nailbed  pressure on all extremities)  No brainstem reflexes were elicited. Pupils fixed and dilated (left about 2mm more than right), no corneal reflex on both sides, no oculocephalic reflex, no oculovestibular reflex on both sides, no cough or gag.     I reviewed her repeat EEG done today while patient was normothermic and SBP > 100. It was consistent with electrocerebral inactivity.    I also reviewed the CT head done on admission which showed diffuse cerebral edema.    An apnea test was deferred at this time due to hemodynamic instability (tachycardia, tachypnea and hypertension.)     A/P  Hypoxic ischemic encephalopathy  -today's exam along with ancillary findings (CT head and EEG) are consistent with the clinical diagnosis of brain death; she fulfills brain  death criteria set by the [facility]  -I had a family meeting with the patient's , father, sister, stepmother and ex- present. My NP and the ICU nurse were also present. I explained her devastating prognosis, which is irreversible, and the clinical diagnosis of brain death based on her neurologic exam and the ancillary findings described above. I described her clinical course and the significance of her exam and ancillary test findings in detail. I answered the family's questions regarding her neurologic prognosis.     I spent 30 minutes of critical care time with this patient, which was spent directly examining the patient as well as coordinating care and explaining treatment and prognosis to the patient's next of kin.

## 2020-05-10 LAB — BACTERIA UR CULT: NO GROWTH

## 2020-05-10 NOTE — DISCHARGE SUMMARY
[facility]  Discharge Summary      Admit Date: 5/7/2020    Discharge Date and Time: 5/9/2020  6:49 PM    Attending Physician: No att. providers found     Reason for Admission: cardiac arrest    Procedures Performed: Procedure(s) (LRB):  CATHETERIZATION, HEART, BOTH LEFT AND RIGHT (N/A)    HPI:   44-year-old female with past medical history of diabetes, hypertension and multiple psychiatric diagnosis including ADHD, bipolar, major depressive disorder brought in by EMS following cardiac arrest in field.  History obtained from patient's  and ED physician.  Per  patient was in usual state of health and last known normal around 530-600 a.m. this morning.  They returned to sleep and  woke up at around 7:15 a.m. and noticed that patient was unresponsive, pulseless and covered in vomit.  He began CPR and notified EMS. On EMS arrival, ACLS continued and patient received epi x 4 and Narcan with ROSC achieved after about 20 min..  Patient was intubated in the field.    In ED,  re-intubated 2/2 issues ventilating patient. CXR right upper lobe infection vs atelectasis, CT head obtained showing global hypoxic ischemic injury and diffuse cerebral edema.  Utox + opiates and benzos.   Per , patient took her prescribed nighttime medications.  He initially told ER physician that patient had voiced suicidal ideation about 1 week ago but when discussed again he denied.     Hospital Course (synopsis of major diagnoses, care, treatment, and services provided during the course of the hospital stay):     Admitted s/p Cardiac Arrest likely 2/2 polypharmacy with aspiration event leading to hypoic respiratory failure. On admiision Ct head showed global hypoxic ichemia and diffuse cerebral edema suggesting poor prognosis. Patient was placed on hypothermia protocols. Broad spectum antibiotics started for empiric coverage with cultures resulting ngtd. Cardiology consulted with no compeling evidience of  ACS. Pulmonary consulted for assistance and vent management. Neurology consulted to assess brain function. Following warming protocols, patient was evaluated by neurology, pulm and hospital medicine. Repeat EEG showed electrocerebral inactivity. Patient had positive apnea test.  physical exam findings: unresponsive to noxious stimuli, no brainstem reflexes. Patient was determined to be brain dead.  discussed brain death with family and patient was noted to be an organ donor. [facility] assumed care and patient discharge to their facility.     Consults: cardiology, neurology and neurology    Significant Diagnostic Studies: imaging  EEG 5/9:  Impression:  This is an abnormal EEG due to the lack of evidence of cortical activity consistent with electrocerebral inactivity.    CT head:   FINDINGS:  Markedly abnormal appearing exam with diffuse loss of the normal gray-white differentiation throughout the cerebral and cerebellar hemispheres.  There is loss of the normal sulcation pattern throughout the cerebral and cerebellar hemispheres with effacement of the suprasellar and basilar cisterns as well as the foramen magnum.  No gross acute intracranial hemorrhage is identified (noting that the tentorium in sulci are bright compared to the adjacent edematous cerebral and cerebellar parenchyma), no midline shift or hydrocephalus is seen.  There are tiny air-fluid levels in both maxillary sinuses.  Scattered areas of mucosal thickening throughout the mid ethmoid air cells and maxillary sinuses, consider correlation for acute sinusitis.   Impression:       Markedly abnormal appearing exam as above and findings of global hypoxic ischemic injury (anoxic brain injury), with diffuse cerebral edema (with effacement of the saw slightly, suprasellar/basilar cisterns and foramen magnum).        Final Diagnoses:    Principal Problem: Cardiac arrest   Secondary Diagnoses:   Active Hospital Problems    Diagnosis  POA    *Cardiac arrest [I46.9]   Yes    Sinus tachycardia [R00.0]  Yes    Diabetes insipidus [E23.2]  Yes    Brain death [G93.82]  Yes    Acute hypoxemic respiratory failure [J96.01]  Yes    Aspiration into lower respiratory tract [T17.800A]  Yes    Polypharmacy [Z79.899]  Not Applicable    Opiate overdose [T40.601A]  Yes    Benzodiazepine overdose [T42.4X1A]  Yes    Gastroesophageal reflux disease without esophagitis [K21.9]  Yes    Irritable bowel syndrome with diarrhea [K58.0]  Yes    Moderate episode of recurrent major depressive disorder [F33.1]  Yes    Diabetes [E11.9]  Yes    Anxiety [F41.9]  Yes    Mood disorder [F39]  Yes    Insomnia [G47.00]  Yes    Transaminitis [R74.0]  Yes    Hypertension [I10]  Yes      Resolved Hospital Problems   No resolved problems to display.       Discharged Condition:     Disposition:     Follow Up/Patient Instructions: na      Medications:  None (patient  at medical facility)  No discharge procedures on file.

## 2020-05-11 LAB
BACTERIA SPEC AEROBE CULT: ABNORMAL
BACTERIA SPEC AEROBE CULT: ABNORMAL
GRAM STN SPEC: ABNORMAL

## 2020-05-12 LAB
BACTERIA BLD CULT: ABNORMAL
BACTERIA BLD CULT: ABNORMAL
BACTERIA BLD CULT: NORMAL
BACTERIA BLD CULT: NORMAL
BACTERIA UR CULT: NO GROWTH
LABCORP MISC TEST CODE: NORMAL
LABCORP MISC TEST CODE: NORMAL
LABCORP MISC TEST NAME: NORMAL
LABCORP MISC TEST NAME: NORMAL
LABCORP MISCELLANEOUS TEST: NORMAL
LABCORP MISCELLANEOUS TEST: NORMAL

## 2020-05-14 LAB — BACTERIA BLD CULT: NORMAL

## (undated) DEVICE — SHEATH PINNACLE 5FRX10CM W/GUIDEWIRE

## (undated) DEVICE — KIT MICROINTRODUCE MINI 5X10CM

## (undated) DEVICE — Device

## (undated) DEVICE — CATHETER EXPO MLTPK 5FX100-110CM

## (undated) DEVICE — SHEATH PINNACLE 6FRX10CM W/GUIDEWIRE

## (undated) DEVICE — GUIDEWIRE DOUBLE ENDED .035 DIA. 150CML